# Patient Record
Sex: MALE | Race: WHITE | Employment: FULL TIME | URBAN - METROPOLITAN AREA
[De-identification: names, ages, dates, MRNs, and addresses within clinical notes are randomized per-mention and may not be internally consistent; named-entity substitution may affect disease eponyms.]

---

## 2020-10-28 ENCOUNTER — OFFICE VISIT (OUTPATIENT)
Dept: ORTHOPEDIC SURGERY | Age: 55
End: 2020-10-28
Payer: COMMERCIAL

## 2020-10-28 VITALS — WEIGHT: 215 LBS | BODY MASS INDEX: 30.78 KG/M2 | HEIGHT: 70 IN

## 2020-10-28 PROCEDURE — G8417 CALC BMI ABV UP PARAM F/U: HCPCS | Performed by: ORTHOPAEDIC SURGERY

## 2020-10-28 PROCEDURE — G8428 CUR MEDS NOT DOCUMENT: HCPCS | Performed by: ORTHOPAEDIC SURGERY

## 2020-10-28 PROCEDURE — G8484 FLU IMMUNIZE NO ADMIN: HCPCS | Performed by: ORTHOPAEDIC SURGERY

## 2020-10-28 PROCEDURE — 99204 OFFICE O/P NEW MOD 45 MIN: CPT | Performed by: ORTHOPAEDIC SURGERY

## 2020-11-04 ENCOUNTER — TELEPHONE (OUTPATIENT)
Dept: ORTHOPEDIC SURGERY | Age: 55
End: 2020-11-04

## 2020-11-05 ENCOUNTER — TELEPHONE (OUTPATIENT)
Dept: ORTHOPEDIC SURGERY | Age: 55
End: 2020-11-05

## 2020-11-05 NOTE — PROGRESS NOTES
MD Nancy Gallo, [place]         Orthopaedic Surgery and Sports Medicine    Encounter date is 10/28/2020  Patient Name: Christi Lopez  YOB: 1965  Patient's PCP is Tom Whipple MD    SUBJECTIVE  Chief Complaint:  Shoulder Pain (RIGHT)      History of Present Illness:  Christi Lopez is a right handed 48 y.o. male here regarding right shoulder pain. His primary care physicians Elida Nyhan he has been referred for evaluation and treatment of his right shoulder  This a gentleman with a progressive history of significant right shoulder pain. He works in significant manual labor. There was no specific injury. He has had no prior surgery on his right shoulder. He has had pain in intermittently since August but most recently has been much more constant. He does have difficulty sleeping. He has been taking ibuprofen as an anti-inflammatory without improvement. His pain rates from a 5 to an 8 on a pain scale. He has painful range of motion. He definitely has weakness also. There was not a history of injury. Location: diffusely throughout the shoulder  Quality: aching, sharp and constant   Pain Scale: 8/10  Context: overall course is worsening   Alleviating Factors: rest  Exacerbating Factors: elevation, activity, lifting, work and repetitive activity  Associated Symptoms: catching    Sleep pattern is affected by the chief complaint: Yes  The patient has not had PT. The patient has not had an injection. The patient has taken NSAIDs. The patient is working.     Pain Assessment:  Pain Assessment  Location of Pain: Shoulder  Location Modifiers: Right  Severity of Pain: 6  Quality of Pain: Sharp, Dull, Aching  Frequency of Pain: Intermittent  Aggravating Factors: Stretching, Straightening, Exercise  Limiting Behavior: Some  Relieving Factors: Rest  Result of Injury: No  Work-Related Injury: No  Are there other pain locations you wish to document?: No    Review of Systems:  Sam Jimenez's review of systems has been performed by intake and observation. All past and current ROS forms have been scanned into the medical record. He has been instructed to contact his primary care provider regarding ROS issues if not already being addressed at this time. There are no recent changes. The most recent ROS was scanned into media on 10/28/2020    Past Medical History:  (see most recent intake form scanned into media on above date)  Past Medical History:   Diagnosis Date    Hyperlipidemia        Past Surgical History:  (see most recent intake form scanned into media on above date)  Past Surgical History:   Procedure Laterality Date    CERVICAL DISCECTOMY      FOOT SURGERY      right    THROAT SURGERY  [id number]    removal papilloma on tonsil       Allergies:  (see most recent intake form scanned into media on above date)  No Known Allergies    Medications:  (see most recent intake form scanned into media on above date)  No current outpatient medications on file. No current facility-administered medications for this visit. Coagulation:  On a blood thinner: No  History of a bleeding disorder: No  History of a previous blood clot: No    Goal for treatment: Improve function and decrease pain    OBJECTIVE  PHYSICAL EXAM  Vital Signs: There were no vitals filed for this visit. Body mass index is 30.85 kg/m². General Appearance: Patient is adequately groomed with no evidence of malnutrition   Orientation: Patient is alert and oriented to person, place and time  Mood and Affect: Neutral/Euthymic(normal)  Gait and Station: normal    Right Shoulder Examination  Inspection:    Visual deformity noted: No    No swelling noted. No erythema or ecchymosis. Palpation: moderate tenderness to palpation on the anterior, lateral region.      Range of Motion: He has significant limitation of motion secondary to pain and weakness. His active forward flexion is about 120 degrees active abduction is only about 70 degrees due to both weakness and pain. He can externally rotate 30 degrees internally rotates only to the midline or slightly beyond midline. Passive range of motion is significantly better than active although quite painful. Stability and Special Testing:  Obriens test: positive              Apprehension test: negative              Load and Shift test: negative                                    Impingement test: positive                         Sulcus sign: negative              Bear Hug test: negative            Lift-Off test: not tested                   Cuff Drop Arm test: Moderately positive    Strength: Forward flexion: 4/5        Abduction: 4/5        Internal Rotation: 5/5        External Rotation: 5/5    Neurologic: Sensation is intact to light touch throughout the median, ulnar and radial nerve distribution. Vascular: The bilateral upper extremities are warm and well-perfused with brisk capillary refill. Lymphatic: The lymphatic examination bilaterally reveals all areas to be without enlargement or induration    Skin: intact with no cellulitis, rashes, ulcerations, lymphedema or cutaneous lesions noted. Additional Examinations:  Left Upper Extremity: Examination of the left upper extremity does not show any tenderness, deformity or injury. Range of motion is unremarkable. There is no gross instability. There are no rashes, ulcerations or lesions. Strength and tone are normal.  Neck: Examination of the neck does not show any tenderness, deformity or injury. Range of motion is within normal limits. There is no gross instability. There are no rashes, ulcerations or lesions. Strength and tone are normal.      DIAGNOSTICS:  Xrays obtained in office today:  Yes  Xrays reviewed today: Yes  Four views of the right shoulder show   Fracture: No  Dislocation: No  Acromion morphology: Type II   Acromioclavicular joint arthritis: none  Glenohumeral joint arthritis: none  Humeral head superior migration: mild        ASSESSMENT (Medical Decision Making)    Maggie Linton is a 48 y.o. male with the following diagnosis: Right shoulder pain which has been fairly significant over the last few months. Has had progressive loss of function weakness and inability to sleep. His physical examination is consistent with rotator cuff pathology probably rotator cuff tear. He has not responded to oral anti-inflammatories activity modification and localized treatment. ICD-10-CM    1. Right shoulder pain, unspecified chronicity  M25.511 XR SHOULDER RIGHT (MIN 2 VIEWS)     MRI SHOULDER RIGHT WO CONTRAST           PLAN (Medical Decision Making)  Office Procedures:  Orders Placed This Encounter   Procedures    XR SHOULDER RIGHT (MIN 2 VIEWS)    MRI SHOULDER RIGHT WO CONTRAST     Standing Status:   Future     Standing Expiration Date:   10/28/2021     Scheduling Instructions:      TBS @ [facility] AFTER APPROVAL     Order Specific Question:   Reason for exam:     Answer:   RCT       Treatment Plan:    I discussed the diagnosis and treatment options with Sam Jimenez today. I spent at least 60 minutes face to face with this patient today. Greater than 50% of that time was spent counseling, coordinating care, discussing and answering questions regarding the risks, benefits, and complications of right shoulder pain in detail. We discussed precautionary measures to prevent further injury, additional treatment options, additional diagnostic options including MRI scan. He is being referred today for an MRI scan. He may require surgical intervention based on the results of his MRI. Non-steroidal anti-inflammatories medications (NSAIDs) can be used to assist with pain control and to reduce inflammatory changes.   These medications may be over-the-counter or prescribed. We discussed taking the NSAID properly and the precautions. The patient understands that this medication may potentially interfere with other medications. Patient was also instructed to immediately discontinue the medication is there is any possible complication. Jenn Reed was instructed to call the office if his symptoms worsen or if new symptoms appear prior to the next scheduled visit. He is specifically instructed to contact the office between now and schedule appointment if he has concerns related to his condition or if he needs assistance in scheduling any above tests. He is welcome to call for an appointment sooner if he has any additional concerns or questions. This dictation was performed with a verbal recognition program (DRAGON) and it was checked for errors. It is possible that there are still dictated errors within this office note. If so, please bring any errors to my attention for an addendum. All efforts were made to ensure that this office note is accurate.

## 2020-11-11 ENCOUNTER — OFFICE VISIT (OUTPATIENT)
Dept: ORTHOPEDIC SURGERY | Age: 55
End: 2020-11-11
Payer: COMMERCIAL

## 2020-11-11 VITALS — HEIGHT: 70 IN | WEIGHT: 215 LBS | BODY MASS INDEX: 30.78 KG/M2

## 2020-11-11 PROCEDURE — G8428 CUR MEDS NOT DOCUMENT: HCPCS | Performed by: ORTHOPAEDIC SURGERY

## 2020-11-11 PROCEDURE — 3017F COLORECTAL CA SCREEN DOC REV: CPT | Performed by: ORTHOPAEDIC SURGERY

## 2020-11-11 PROCEDURE — G8417 CALC BMI ABV UP PARAM F/U: HCPCS | Performed by: ORTHOPAEDIC SURGERY

## 2020-11-11 PROCEDURE — G8484 FLU IMMUNIZE NO ADMIN: HCPCS | Performed by: ORTHOPAEDIC SURGERY

## 2020-11-11 PROCEDURE — 4004F PT TOBACCO SCREEN RCVD TLK: CPT | Performed by: ORTHOPAEDIC SURGERY

## 2020-11-11 PROCEDURE — 99213 OFFICE O/P EST LOW 20 MIN: CPT | Performed by: ORTHOPAEDIC SURGERY

## 2020-11-11 NOTE — PROGRESS NOTES
MD Jeffery Rankin, [place]         Orthopaedic Surgery and Sports Medicine    Encounter date is 10/28/2020  Patient Name: Binh Rodríguez  YOB: 1965  Patient's PCP is Jem Mac MD    SUBJECTIVE  Chief Complaint:  Shoulder Pain (RIGHT MRI RESULTS)      History of Present Illness:  Binh Rodríguez is a right handed 48 y.o. male here regarding right shoulder pain. His primary care physicians Ayesha Guillaume he has been referred for evaluation and treatment of his right shoulder  This a gentleman with a progressive history of significant right shoulder pain. He works in significant manual labor. Following his previous visit he was referred for an MRI scan. He had a long history of shoulder pain. Continues to get worse. There was no specific injury. He has had no prior surgery on his right shoulder. He has had pain in intermittently since August but most recently has been much more constant. He does have difficulty sleeping. He has been taking ibuprofen as an anti-inflammatory without improvement. His pain rates from a 5 to an 8 on a pain scale. He has painful range of motion. He definitely has weakness also. There was not a history of injury. Location: diffusely throughout the shoulder  Quality: aching, sharp and constant   Pain Scale: 8/10  Context: overall course is worsening   Alleviating Factors: rest  Exacerbating Factors: elevation, activity, lifting, work and repetitive activity  Associated Symptoms: catching    Sleep pattern is affected by the chief complaint: Yes  The patient has not had PT. The patient has not had an injection. The patient has taken NSAIDs. The patient is working.     Pain Assessment:  Pain Assessment  Location of Pain: Shoulder  Location Modifiers: Left  Severity of Pain: 2  Quality of Pain: Aching, Dull  Frequency of Pain: Intermittent  Aggravating Factors: Stretching, Straightening, Exercise  Relieving Factors: Rest, Nsaids  Result of Injury: No  Work-Related Injury: No  Are there other pain locations you wish to document?: No    Review of Systems:  Sam Jimenez's review of systems has been performed by intake and observation. All past and current ROS forms have been scanned into the medical record. He has been instructed to contact his primary care provider regarding ROS issues if not already being addressed at this time. There are no recent changes. The most recent ROS was scanned into media on 10/28/2020    Past Medical History:  (see most recent intake form scanned into media on above date)  Past Medical History:   Diagnosis Date    Hyperlipidemia        Past Surgical History:  (see most recent intake form scanned into media on above date)  Past Surgical History:   Procedure Laterality Date    CERVICAL DISCECTOMY      FOOT SURGERY      right    THROAT SURGERY  [id number]    removal papilloma on tonsil       Allergies:  (see most recent intake form scanned into media on above date)  No Known Allergies    Medications:  (see most recent intake form scanned into media on above date)  No current outpatient medications on file. No current facility-administered medications for this visit. Coagulation:  On a blood thinner: No  History of a bleeding disorder: No  History of a previous blood clot: No    Goal for treatment: Improve function and decrease pain    OBJECTIVE  PHYSICAL EXAM  Vital Signs: There were no vitals filed for this visit. Body mass index is 30.85 kg/m². General Appearance: Patient is adequately groomed with no evidence of malnutrition   Orientation: Patient is alert and oriented to person, place and time  Mood and Affect: Neutral/Euthymic(normal)  Gait and Station: normal    Right Shoulder Examination  Inspection:    Visual deformity noted: No    No swelling noted. No erythema or ecchymosis. Palpation: moderate tenderness to palpation on the anterior, lateral region. That pain continues to progress. 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 Range of Motion: He has significant limitation of motion secondary to pain and weakness. His active forward flexion is about 120 degrees active abduction is only about 70 degrees due to both weakness and pain. Passive range of motion is significantly better than active although quite painful. His biceps tests are all positive. His [place]'s test is very positive. So his superior labral tear and the associated biceps injury are all related. Stability and Special Testing:  Obriens test: positive              Apprehension test: negative              Load and Shift test: negative                                    Impingement test: positive                         Sulcus sign: negative              Bear Hug test: negative            Lift-Off test: not tested                   Cuff Drop Arm test: Moderately positive    Strength: Forward flexion: 4/5        Abduction: 4/5        Internal Rotation: 5/5        External Rotation: 5/5    Neurologic: Sensation is intact to light touch throughout the median, ulnar and radial nerve distribution. Vascular: The bilateral upper extremities are warm and well-perfused with brisk capillary refill. Lymphatic: The lymphatic examination bilaterally reveals all areas to be without enlargement or induration    Skin: intact with no cellulitis, rashes, ulcerations, lymphedema or cutaneous lesions noted. Additional Examinations:  Left Upper Extremity: Examination of the left upper extremity does not show any tenderness, deformity or injury. Range of motion is unremarkable. There is no gross instability. There are no rashes, ulcerations or lesions.   Strength and tone are normal.  Neck: Examination of the neck does not show any tenderness, deformity or injury. Range of motion is within normal limits. There is no gross instability. There are no rashes, ulcerations or lesions. Strength and tone are normal.      DIAGNOSTICS:  Xrays obtained in office today: No  Xrays reviewed today: Yes, reviewed  Four views of the right shoulder show   Fracture: No  Dislocation: No  Acromion morphology: Type II   Acromioclavicular joint arthritis: none  Glenohumeral joint arthritis: none  Humeral head superior migration: mild    MRI scan of the right shoulder was performed on 11/6/2020 at [address] [place] [address]. He has a large circumferential tear of the labrum both anterior posterior superior and inferior with perilabral cysts. Is associated with instability of his biceps tendon. He has rotator cuff tendinitis. He has significant degenerative AC joint arthropathy with some marrow edema in the distal clavicle and the acromion. ASSESSMENT (Medical Decision Making)    Kalee Byrd is a 48 y.o. male with the following diagnosis: Right shoulder pain which has been fairly significant over the last few months. Has had progressive loss of function weakness and inability to sleep. His physical examination and his MRI are consistent with the findings. He has a very large labral tear with associated instability and tearing of the biceps tendon. He also has rotator cuff tendinitis with outlet stenosis and AC joint arthritis both of which are impinging upon his rotator cuff tendon. No diagnosis found. PLAN (Medical Decision Making)  Office Procedures:  No orders of the defined types were placed in this encounter. Treatment Plan:    I discussed the diagnosis and treatment options with Sam Jimenez today. The current plan after discussion is to proceed with a video arthroscopy surgical intervention of the right shoulder. He will have a subacromial decompression distal clavicle resection with least 1 cm resection from the clavicle.   That would include include debridement of the [facility] joint. Also will require debridement of the labrum and probable biceps tenodesis to take the stress off of the superior labral chronic tear. There will be inspection of the glenoid labrum probable debridement of the subacromial bursitis. We also discussed with him the postoperative rehab protocol. He will follow-up with us postop. After discussion today, the patient has elected to proceed with surgical intervention. The surgical procedure was discussed in detail. The risks and possible complications of the surgery in general, as well as those directly related to this procedure were reviewed today. General risks include but are not limited to persistent pain, infection (including COVID-19), excessive blood loss, neurovascular injury, chronic swelling or edema, and the potential need for additional treatment or surgical intervention in the future. The patient understands that, again, the risks and possible complications are not limited to those specifically stated above. In addition today, we discussed the preoperative and postoperative protocol, the often lengthy recovery, and the expectation that the patient will be compliant with instructions and perform the appropriate rehab program as prescribed. The patient accepted the above risks, possible complications, and protocol and elects to proceed. All questions were answered appropriately, and they understand that they can contact the office at any time to further discuss any questions or concerns. Radha Gamboa was instructed to call the office if his symptoms worsen or if new symptoms appear prior to the next scheduled visit. He is specifically instructed to contact the office between now and schedule appointment if he has concerns related to his condition or if he needs assistance in scheduling any above tests. He is welcome to call for an appointment sooner if he has any additional concerns or questions.         This

## 2020-12-03 ENCOUNTER — TELEPHONE (OUTPATIENT)
Dept: ORTHOPEDIC SURGERY | Age: 55
End: 2020-12-03

## 2020-12-07 NOTE — PROGRESS NOTES
Neomia Boaz    Age 48 y.o.    male    1965    MRN [id number]    12/15/2020  Arrival Time_____________  OR Time____________[address]     Procedure(s):  DIAGNOSTIC VIDEO ARTHROSCOPY RIGHT SHOULDER, OPEN BICEPS TENODESIS, DECOMPRESSION, DISTAL CLAVICLE RESECTION -BLOCK-                      General    Surgeon(s):  Manjula Lopes, MD       Phone [phone] ([place]) [phone] (work)    [address] [place] [address]  Cell Work  _________________________________________________________________  _________________________________________________________________  _________________________________________________________________  _________________________________________________________________  _________________________________________________________________      PCP _____________________________ Phone_________________       H&P__________________Bringing    Chart            Epic  DOS     Called_______  EKG__________________Bringing    Chart            Epic  DOS     Called_______  LAB__________________ Bringing    Chart            Epic  DOS     Called_______  Cardiac Clearance_______Bringing    Chart            Epic      DOS       Called_______    Cardiologist________________________ Phone___________________________      ? Tenriism concerns / Waiver on Chart            [employer]_____________  ? Pre-op Instructions Given [place]          ______________________________  ? Directions to Surgery Center                          ______________________________  ? Transportation Home_______________      _______________________________  ?  Crutches/Walker__________________        _______________________________      ________Pre-op Orders   _______Transcribed    _______Wt.  ________Pharmacy          _______SCD  ______VTE     ______[employer]  ________Consent             ________TED Maryfrances [place]

## 2020-12-08 RX ORDER — ROSUVASTATIN CALCIUM 20 MG/1
20 TABLET, COATED ORAL DAILY
COMMUNITY

## 2020-12-08 NOTE — PROGRESS NOTES
Obstructive Sleep Apnea (JEFFREY) Screening     Patient:  Severo Vu    YOB: 1965      Medical Record #:  [id number]                     Date:  12/8/2020     1. Are you a loud and/or regular snorer? [x]  Yes       [] No    2. Have you been observed to gasp or stop breathing during sleep? [x]  Yes       [] No    3. Do you feel tired or groggy upon awakening or do you awaken with a headache?           []  Yes       [x] No    4. Are you often tired or fatigued during the wake time hours? []  Yes       [x] No    5. Do you fall asleep sitting, reading, watching TV or driving? []  Yes       [x] No    6. Do you often have problems with memory or concentration? []  Yes       [x] No    **If patient's score is ? 3 they are considered high risk for JEFFREY. An Anesthesia provider will evaluate the patient and develop a plan of care the day of surgery. Note:  If the patient's BMI is more than 35 kg m¯² , has neck circumference > 40 cm, and/or high blood pressure the risk is greater (© American Sleep Apnea Association, 2006).

## 2020-12-09 ENCOUNTER — OFFICE VISIT (OUTPATIENT)
Dept: PRIMARY CARE CLINIC | Age: 55
End: 2020-12-09

## 2020-12-09 NOTE — PROGRESS NOTES
Sam Jimenez received a viral test for COVID-19. They were educated on isolation and quarantine as appropriate. For any symptoms, they were directed to seek care from their PCP, given contact information to establish with a doctor, directed to an urgent care or the emergency room.

## 2020-12-09 NOTE — PATIENT INSTRUCTIONS

## 2020-12-10 LAB — SARS-COV-2, NAA: NOT DETECTED

## 2020-12-11 NOTE — TELEPHONE ENCOUNTER
[address] website still pending. Can't expedite today. If I don't receive auth by Monday then I will expedite.

## 2020-12-14 ENCOUNTER — TELEPHONE (OUTPATIENT)
Dept: ORTHOPEDIC SURGERY | Age: 55
End: 2020-12-14

## 2020-12-14 ENCOUNTER — ANESTHESIA EVENT (OUTPATIENT)
Dept: OPERATING ROOM | Age: 55
End: 2020-12-14
Payer: COMMERCIAL

## 2020-12-14 NOTE — TELEPHONE ENCOUNTER
Auth: # [id number]    Date: 12/15/2020 - 03/15/2021  Type of SX:  OP  Location: [facility]  CPT: [id number]  [id number]   [id number]   DX Code: X09.492  L12.114   M75.41   M19.011  SX area: VAS biceps tenodesis decompression rt shoulder  Insurance: [facility]

## 2020-12-14 NOTE — TELEPHONE ENCOUNTER
Patient called today after calling [facility] with approval # for OP surgery on 12/15/20 #[id number]

## 2020-12-15 ENCOUNTER — ANESTHESIA (OUTPATIENT)
Dept: OPERATING ROOM | Age: 55
End: 2020-12-15
Payer: COMMERCIAL

## 2020-12-15 ENCOUNTER — HOSPITAL ENCOUNTER (OUTPATIENT)
Age: 55
Setting detail: OUTPATIENT SURGERY
Discharge: HOME | End: 2020-12-15
Attending: ORTHOPAEDIC SURGERY | Admitting: ORTHOPAEDIC SURGERY
Payer: COMMERCIAL

## 2020-12-15 VITALS
RESPIRATION RATE: 6 BRPM | SYSTOLIC BLOOD PRESSURE: 92 MMHG | OXYGEN SATURATION: 96 % | DIASTOLIC BLOOD PRESSURE: 49 MMHG

## 2020-12-15 VITALS
SYSTOLIC BLOOD PRESSURE: 135 MMHG | DIASTOLIC BLOOD PRESSURE: 96 MMHG | OXYGEN SATURATION: 94 % | RESPIRATION RATE: 19 BRPM | TEMPERATURE: 97.8 F | HEART RATE: 79 BPM | WEIGHT: 220 LBS | BODY MASS INDEX: 31.5 KG/M2 | HEIGHT: 70 IN

## 2020-12-15 PROCEDURE — 6360000002 HC RX W HCPCS: Performed by: ANESTHESIOLOGY

## 2020-12-15 PROCEDURE — 6360000002 HC RX W HCPCS: Performed by: ORTHOPAEDIC SURGERY

## 2020-12-15 PROCEDURE — 2500000003 HC RX 250 WO HCPCS: Performed by: NURSE ANESTHETIST, CERTIFIED REGISTERED

## 2020-12-15 PROCEDURE — 3600000014 HC SURGERY LEVEL 4 ADDTL 15MIN: Performed by: ORTHOPAEDIC SURGERY

## 2020-12-15 PROCEDURE — 2580000003 HC RX 258: Performed by: ANESTHESIOLOGY

## 2020-12-15 PROCEDURE — 3700000001 HC ADD 15 MINUTES (ANESTHESIA): Performed by: ORTHOPAEDIC SURGERY

## 2020-12-15 PROCEDURE — 7100000000 HC PACU RECOVERY - FIRST 15 MIN: Performed by: ORTHOPAEDIC SURGERY

## 2020-12-15 PROCEDURE — 64415 NJX AA&/STRD BRCH PLXS IMG: CPT | Performed by: ANESTHESIOLOGY

## 2020-12-15 PROCEDURE — 7100000011 HC PHASE II RECOVERY - ADDTL 15 MIN: Performed by: ORTHOPAEDIC SURGERY

## 2020-12-15 PROCEDURE — 2709999900 HC NON-CHARGEABLE SUPPLY: Performed by: ORTHOPAEDIC SURGERY

## 2020-12-15 PROCEDURE — 2580000003 HC RX 258: Performed by: ORTHOPAEDIC SURGERY

## 2020-12-15 PROCEDURE — 7100000010 HC PHASE II RECOVERY - FIRST 15 MIN: Performed by: ORTHOPAEDIC SURGERY

## 2020-12-15 PROCEDURE — 3700000000 HC ANESTHESIA ATTENDED CARE: Performed by: ORTHOPAEDIC SURGERY

## 2020-12-15 PROCEDURE — 2500000003 HC RX 250 WO HCPCS: Performed by: ORTHOPAEDIC SURGERY

## 2020-12-15 PROCEDURE — C1769 GUIDE WIRE: HCPCS | Performed by: ORTHOPAEDIC SURGERY

## 2020-12-15 PROCEDURE — 6360000002 HC RX W HCPCS: Performed by: NURSE ANESTHETIST, CERTIFIED REGISTERED

## 2020-12-15 PROCEDURE — 2720000010 HC SURG SUPPLY STERILE: Performed by: ORTHOPAEDIC SURGERY

## 2020-12-15 PROCEDURE — 7100000001 HC PACU RECOVERY - ADDTL 15 MIN: Performed by: ORTHOPAEDIC SURGERY

## 2020-12-15 PROCEDURE — 3600000004 HC SURGERY LEVEL 4 BASE: Performed by: ORTHOPAEDIC SURGERY

## 2020-12-15 PROCEDURE — C1713 ANCHOR/SCREW BN/BN,TIS/BN: HCPCS | Performed by: ORTHOPAEDIC SURGERY

## 2020-12-15 DEVICE — ENDOBUTTON CL ULTRA 20MM
Type: IMPLANTABLE DEVICE | Site: SHOULDER | Status: FUNCTIONAL
Brand: ENDOBUTTON

## 2020-12-15 RX ORDER — MIDAZOLAM HYDROCHLORIDE 1 MG/ML
INJECTION INTRAMUSCULAR; INTRAVENOUS
Status: COMPLETED
Start: 2020-12-15 | End: 2020-12-15

## 2020-12-15 RX ORDER — DIPHENHYDRAMINE HYDROCHLORIDE 50 MG/ML
12.5 INJECTION INTRAMUSCULAR; INTRAVENOUS
Status: DISCONTINUED | OUTPATIENT
Start: 2020-12-15 | End: 2020-12-15 | Stop reason: HOSPADM

## 2020-12-15 RX ORDER — MORPHINE SULFATE 2 MG/ML
2 INJECTION, SOLUTION INTRAMUSCULAR; INTRAVENOUS EVERY 5 MIN PRN
Status: DISCONTINUED | OUTPATIENT
Start: 2020-12-15 | End: 2020-12-15 | Stop reason: HOSPADM

## 2020-12-15 RX ORDER — SODIUM CHLORIDE, SODIUM LACTATE, POTASSIUM CHLORIDE, CALCIUM CHLORIDE 600; 310; 30; 20 MG/100ML; MG/100ML; MG/100ML; MG/100ML
INJECTION, SOLUTION INTRAVENOUS CONTINUOUS
Status: DISCONTINUED | OUTPATIENT
Start: 2020-12-15 | End: 2020-12-15 | Stop reason: HOSPADM

## 2020-12-15 RX ORDER — LABETALOL HYDROCHLORIDE 5 MG/ML
5 INJECTION, SOLUTION INTRAVENOUS EVERY 10 MIN PRN
Status: DISCONTINUED | OUTPATIENT
Start: 2020-12-15 | End: 2020-12-15 | Stop reason: HOSPADM

## 2020-12-15 RX ORDER — FENTANYL CITRATE 50 UG/ML
INJECTION, SOLUTION INTRAMUSCULAR; INTRAVENOUS
Status: COMPLETED
Start: 2020-12-15 | End: 2020-12-15

## 2020-12-15 RX ORDER — ONDANSETRON 2 MG/ML
INJECTION INTRAMUSCULAR; INTRAVENOUS PRN
Status: DISCONTINUED | OUTPATIENT
Start: 2020-12-15 | End: 2020-12-15 | Stop reason: SDUPTHER

## 2020-12-15 RX ORDER — MIDAZOLAM HYDROCHLORIDE 1 MG/ML
INJECTION INTRAMUSCULAR; INTRAVENOUS PRN
Status: DISCONTINUED | OUTPATIENT
Start: 2020-12-15 | End: 2020-12-15 | Stop reason: SDUPTHER

## 2020-12-15 RX ORDER — OXYCODONE HYDROCHLORIDE AND ACETAMINOPHEN 5; 325 MG/1; MG/1
2 TABLET ORAL PRN
Status: DISCONTINUED | OUTPATIENT
Start: 2020-12-15 | End: 2020-12-15 | Stop reason: HOSPADM

## 2020-12-15 RX ORDER — BUPIVACAINE HYDROCHLORIDE 2.5 MG/ML
INJECTION, SOLUTION INFILTRATION; PERINEURAL PRN
Status: DISCONTINUED | OUTPATIENT
Start: 2020-12-15 | End: 2020-12-15 | Stop reason: ALTCHOICE

## 2020-12-15 RX ORDER — LIDOCAINE HYDROCHLORIDE 10 MG/ML
0.3 INJECTION, SOLUTION EPIDURAL; INFILTRATION; INTRACAUDAL; PERINEURAL
Status: DISCONTINUED | OUTPATIENT
Start: 2020-12-15 | End: 2020-12-15 | Stop reason: HOSPADM

## 2020-12-15 RX ORDER — LIDOCAINE HYDROCHLORIDE 20 MG/ML
INJECTION, SOLUTION INFILTRATION; PERINEURAL PRN
Status: DISCONTINUED | OUTPATIENT
Start: 2020-12-15 | End: 2020-12-15 | Stop reason: SDUPTHER

## 2020-12-15 RX ORDER — PROMETHAZINE HYDROCHLORIDE 25 MG/ML
6.25 INJECTION, SOLUTION INTRAMUSCULAR; INTRAVENOUS
Status: DISCONTINUED | OUTPATIENT
Start: 2020-12-15 | End: 2020-12-15 | Stop reason: HOSPADM

## 2020-12-15 RX ORDER — SODIUM CHLORIDE, SODIUM LACTATE, POTASSIUM CHLORIDE, AND CALCIUM CHLORIDE .6; .31; .03; .02 G/100ML; G/100ML; G/100ML; G/100ML
IRRIGANT IRRIGATION PRN
Status: DISCONTINUED | OUTPATIENT
Start: 2020-12-15 | End: 2020-12-15 | Stop reason: ALTCHOICE

## 2020-12-15 RX ORDER — ROCURONIUM BROMIDE 10 MG/ML
INJECTION, SOLUTION INTRAVENOUS PRN
Status: DISCONTINUED | OUTPATIENT
Start: 2020-12-15 | End: 2020-12-15 | Stop reason: SDUPTHER

## 2020-12-15 RX ORDER — DEXAMETHASONE SODIUM PHOSPHATE 10 MG/ML
INJECTION INTRAMUSCULAR; INTRAVENOUS PRN
Status: DISCONTINUED | OUTPATIENT
Start: 2020-12-15 | End: 2020-12-15 | Stop reason: SDUPTHER

## 2020-12-15 RX ORDER — FENTANYL CITRATE 50 UG/ML
INJECTION, SOLUTION INTRAMUSCULAR; INTRAVENOUS PRN
Status: DISCONTINUED | OUTPATIENT
Start: 2020-12-15 | End: 2020-12-15 | Stop reason: SDUPTHER

## 2020-12-15 RX ORDER — HYDRALAZINE HYDROCHLORIDE 20 MG/ML
5 INJECTION INTRAMUSCULAR; INTRAVENOUS EVERY 10 MIN PRN
Status: DISCONTINUED | OUTPATIENT
Start: 2020-12-15 | End: 2020-12-15 | Stop reason: HOSPADM

## 2020-12-15 RX ORDER — SODIUM CHLORIDE 0.9 % (FLUSH) 0.9 %
10 SYRINGE (ML) INJECTION PRN
Status: DISCONTINUED | OUTPATIENT
Start: 2020-12-15 | End: 2020-12-15 | Stop reason: HOSPADM

## 2020-12-15 RX ORDER — OXYCODONE HYDROCHLORIDE AND ACETAMINOPHEN 5; 325 MG/1; MG/1
1 TABLET ORAL EVERY 6 HOURS PRN
Qty: 28 TABLET | Refills: 0 | Status: SHIPPED | OUTPATIENT
Start: 2020-12-15 | End: 2020-12-22

## 2020-12-15 RX ORDER — MAGNESIUM HYDROXIDE 1200 MG/15ML
LIQUID ORAL CONTINUOUS PRN
Status: COMPLETED | OUTPATIENT
Start: 2020-12-15 | End: 2020-12-15

## 2020-12-15 RX ORDER — OXYCODONE HYDROCHLORIDE AND ACETAMINOPHEN 5; 325 MG/1; MG/1
1 TABLET ORAL PRN
Status: DISCONTINUED | OUTPATIENT
Start: 2020-12-15 | End: 2020-12-15 | Stop reason: HOSPADM

## 2020-12-15 RX ORDER — MORPHINE SULFATE 2 MG/ML
1 INJECTION, SOLUTION INTRAMUSCULAR; INTRAVENOUS EVERY 5 MIN PRN
Status: DISCONTINUED | OUTPATIENT
Start: 2020-12-15 | End: 2020-12-15 | Stop reason: HOSPADM

## 2020-12-15 RX ORDER — SODIUM CHLORIDE 0.9 % (FLUSH) 0.9 %
10 SYRINGE (ML) INJECTION EVERY 12 HOURS SCHEDULED
Status: DISCONTINUED | OUTPATIENT
Start: 2020-12-15 | End: 2020-12-15 | Stop reason: HOSPADM

## 2020-12-15 RX ORDER — MEPERIDINE HYDROCHLORIDE 50 MG/ML
12.5 INJECTION INTRAMUSCULAR; INTRAVENOUS; SUBCUTANEOUS EVERY 5 MIN PRN
Status: DISCONTINUED | OUTPATIENT
Start: 2020-12-15 | End: 2020-12-15 | Stop reason: HOSPADM

## 2020-12-15 RX ORDER — ONDANSETRON 2 MG/ML
4 INJECTION INTRAMUSCULAR; INTRAVENOUS PRN
Status: DISCONTINUED | OUTPATIENT
Start: 2020-12-15 | End: 2020-12-15 | Stop reason: HOSPADM

## 2020-12-15 RX ADMIN — SODIUM CHLORIDE, POTASSIUM CHLORIDE, SODIUM LACTATE AND CALCIUM CHLORIDE: 600; 310; 30; 20 INJECTION, SOLUTION INTRAVENOUS at 06:41

## 2020-12-15 RX ADMIN — MIDAZOLAM HYDROCHLORIDE 4 MG: 2 INJECTION, SOLUTION INTRAMUSCULAR; INTRAVENOUS at 06:54

## 2020-12-15 RX ADMIN — SUGAMMADEX 300 MG: 100 INJECTION, SOLUTION INTRAVENOUS at 09:06

## 2020-12-15 RX ADMIN — LIDOCAINE HYDROCHLORIDE 60 MG: 20 INJECTION, SOLUTION INFILTRATION; PERINEURAL at 07:32

## 2020-12-15 RX ADMIN — ROCURONIUM BROMIDE 20 MG: 10 SOLUTION INTRAVENOUS at 08:39

## 2020-12-15 RX ADMIN — DEXAMETHASONE SODIUM PHOSPHATE 8 MG: 10 INJECTION INTRAMUSCULAR; INTRAVENOUS at 07:32

## 2020-12-15 RX ADMIN — SODIUM CHLORIDE, POTASSIUM CHLORIDE, SODIUM LACTATE AND CALCIUM CHLORIDE: 600; 310; 30; 20 INJECTION, SOLUTION INTRAVENOUS at 09:20

## 2020-12-15 RX ADMIN — CEFAZOLIN 2 G: 10 INJECTION, POWDER, FOR SOLUTION INTRAVENOUS at 07:43

## 2020-12-15 RX ADMIN — MIDAZOLAM HYDROCHLORIDE 2 MG: 2 INJECTION, SOLUTION INTRAMUSCULAR; INTRAVENOUS at 07:28

## 2020-12-15 RX ADMIN — ONDANSETRON 4 MG: 2 INJECTION INTRAMUSCULAR; INTRAVENOUS at 07:40

## 2020-12-15 RX ADMIN — FENTANYL CITRATE 100 MCG: 50 INJECTION INTRAMUSCULAR; INTRAVENOUS at 06:54

## 2020-12-15 RX ADMIN — ROCURONIUM BROMIDE 50 MG: 10 SOLUTION INTRAVENOUS at 07:32

## 2020-12-15 ASSESSMENT — PULMONARY FUNCTION TESTS
PIF_VALUE: 23
PIF_VALUE: 23
PIF_VALUE: 24
PIF_VALUE: 23
PIF_VALUE: 24
PIF_VALUE: 24
PIF_VALUE: 23
PIF_VALUE: 24
PIF_VALUE: 24
PIF_VALUE: 10
PIF_VALUE: 2
PIF_VALUE: 1
PIF_VALUE: 24
PIF_VALUE: 23
PIF_VALUE: 24
PIF_VALUE: 6
PIF_VALUE: 23
PIF_VALUE: 19
PIF_VALUE: 23
PIF_VALUE: 24
PIF_VALUE: 24
PIF_VALUE: 25
PIF_VALUE: 24
PIF_VALUE: 24
PIF_VALUE: 1
PIF_VALUE: 24
PIF_VALUE: 23
PIF_VALUE: 23
PIF_VALUE: 24
PIF_VALUE: 23
PIF_VALUE: 23
PIF_VALUE: 24
PIF_VALUE: 23
PIF_VALUE: 24
PIF_VALUE: 23
PIF_VALUE: 24
PIF_VALUE: 9
PIF_VALUE: 23
PIF_VALUE: 24
PIF_VALUE: 22
PIF_VALUE: 23
PIF_VALUE: 24
PIF_VALUE: 23
PIF_VALUE: 24
PIF_VALUE: 23
PIF_VALUE: 23
PIF_VALUE: 24
PIF_VALUE: 25
PIF_VALUE: 23
PIF_VALUE: 19
PIF_VALUE: 23
PIF_VALUE: 26
PIF_VALUE: 1
PIF_VALUE: 24
PIF_VALUE: 23
PIF_VALUE: 24
PIF_VALUE: 23
PIF_VALUE: 23
PIF_VALUE: 24
PIF_VALUE: 24
PIF_VALUE: 11
PIF_VALUE: 23
PIF_VALUE: 1
PIF_VALUE: 1
PIF_VALUE: 23
PIF_VALUE: 24
PIF_VALUE: 5
PIF_VALUE: 23
PIF_VALUE: 23
PIF_VALUE: 24
PIF_VALUE: 23
PIF_VALUE: 24
PIF_VALUE: 24
PIF_VALUE: 23
PIF_VALUE: 2
PIF_VALUE: 24
PIF_VALUE: 0
PIF_VALUE: 23
PIF_VALUE: 23
PIF_VALUE: 24
PIF_VALUE: 23
PIF_VALUE: 23
PIF_VALUE: 22
PIF_VALUE: 24
PIF_VALUE: 24
PIF_VALUE: 1
PIF_VALUE: 24
PIF_VALUE: 23
PIF_VALUE: 23

## 2020-12-15 ASSESSMENT — PAIN SCALES - GENERAL
PAINLEVEL_OUTOF10: 0

## 2020-12-15 ASSESSMENT — PAIN - FUNCTIONAL ASSESSMENT: PAIN_FUNCTIONAL_ASSESSMENT: 0-10

## 2020-12-15 NOTE — OP NOTE
Diagnostic Shoulder Arthroscopy with Subacromial Decompression and Distal Clavicle Resection      aSm Jimenez (1965)   Date of Service: 12/15/2020      Preoperative Diagnosis-     · right shoulder Acromioclavicular joint arthritis/osteolysis   · right shoulder Biceps Tear with Tendonopathy   · right shoulder Type I and II labral tears    · right shoulderOutlet Stenosis with Subacromial Impingement       right shoulder operative Diagnosis-   · Same     Procedure-  Right shoulder open subpectoral biceps tenodesis (CPT-71189)     · right shoulderrthroscopic Acromioclavicular joint resection (>1 centimeter) ([id number])   · right shoulderArthroscopic Extensive Debridement of Intra-articular space  ([id number])  · right shoulder Arthroscopic Subacromial Decompression and Partial Acromioplasty  CPT- 97533)     Estimated blood Loss:  Less than 50 cc                          Surgeon-  Memo Vega MD      Anesthesia- Suprascapular Nerve Block and General      Indications for Operation  Persistent showed pain with subjective and objective findings consistent with outlet stenosis and subacromial impingement as well as AC joint symptoms consistent with acromioclavicular joint arthritis, degenerative changes and possible osteolysis,. The patient chose to proceed with the aforementioned procedures. At no time were any guarantees implied or stated. Site Marking and Surgical Prep  The patient was seen in the holding area and the appropriate extremity marked with a pen. The patient was taken to the operative suite, identified, placed on the operating room table in the supine position. After induction of general anesthesia, the patient was placed in the lateral decubitus position with an axillary roll and all bony prominences adequately padded. Examination  Under Anesthesia & Suprascapular Block. Full symmetric range of motion, no instability. The arm was then placed in the arm arriaza.   A suprascapular nerve block was performed with 30ml of 0.25% marcaine. Diagnostic Arthroscopy  The upper extremity was prepped with chloraprep and draped in the standard fashion. A spinal needle was inserted into the glenohumeral joint and it was insufflated with 30ml of normal saline. A standard midglenoid posterior portal was established. The trocar and cannula were inserted. The trocar was removed and the arthroscope and inflow inserted. Systematic intra-articular arthroscopy was performed and the findings are summarized below. 1.  Superior Labrum/Biceps Tendon were both torn  2. Anterior/Posterior Labrum with type I and II labral tears  3. Rotator Cuff- no tears  4. Inferior Axillary Recess- no lloose bodies  5. Articular Surfaces- Intact  6. Synovium showed evidence of significant hypertrophic synovitis  7. The articular surface of the rotator cuff tendons appeared intact    Arthroscopic extensive intra-articular debridement  With the arthroscope in the subacromial space visualizing the areas of labral pathology, inflammatory hypertrophic synovitis, and very small areas of minimal articular cartilage change as well as damage to the rotator cuff tendon I felt that treatment via debridement of these lesions was indicated. This is a identification of these pathologies occurred primarily because of diagnostic video arthroscopy which was performed as stated above. At this point we placed an anterior mid glenoid portal and proceeded with extensive debridement of the labral pathology which was nearly circum-circumferential.  Debridement was performed of the articular surfaces of any defects as well as a hypertrophic synovitis. Due to the biceps tendon injury debridement of the biceps tendon was necessary also. Subacromial Decompression and Partial Acromioplasty  The trocar and cannula were redirected to the subacromial space. The arthroscope and inflow were inserted.   A lateral portal was established after localizing the subacromial space with a spinal needle. Using a both a shaver and a radiofrequency probe, the bursa and the tissue beneath the acromion were removed. The coracromial ligament was divided. A 5.5 mm bur was placed through the lateral portal and the anterior aspect of the acromion was removed. Distal Clavicle Resection  An anterior portal was established after localizing the [facility] joint with a spinal needle. The distal clavicle was carefully exposed by removing soft tissue with both a shaver and a radiofrequency probe. With the arthroscope in the lateral portal, the 5.5 mm bur was placed through the anterior portal and 1 cm of distal clavicle was removed. The arthroscope was placed in the anterior portal to confirm a smooth, symmetric resection space. Bursal Surface of Rotator Cuff  There was some minimal scuffing both no evidence of a tear requiring repair    Open Long Head Biceps Tenodesis    The arm was then repositioned for the open subpectoral biceps tenodesis. Due to the potential increased risk of infection when converting from arthroscopy to open surgery, certain precautionary measures were taken at this point to reduce that risk. Although this area was previously prepped with ChloraPrep for the arthroscopic portion the area of open surgery was reprepped with new ChloraPrep sponge application. Myself and the immediate surgical team replaced our outer gloves with a new pair of gloves. The area was over draped with a three-quarter sheet and Ioban drape was used at this location as well. Once these precautionary measures were taken to reduce our risk of infection the open procedure proceeded. A subpectoral incision was made. Garrick retractors were placed around the humerus. The biceps tendon was identified and exteriorized. The biceps tendon was prepared using a FiberLoop. The bicipital groove was prepared by removing soft tissue.      A unicortical Endobutton proximal humeral biceps tenodesis was then planned. The guidepin for the unicortical button was drilled through the anterior cortex of the humerus. A reamer was used to open the anterior cortex. The sutures from the biceps tendon were threaded through the button, which was then placed through anterior cortex of the humerus. Secure fixation on the inner cortex was confirmed and the tendon was pulled into the humeral tunnel. The suture was tied with 3 half-hitches using a knot pusher securing the tendon in place. The tendon was well secured at the level of the inferior border of the pectoralis major tendon. The wound was irrigated with normal saline and the layers closed using 2-0 vicryl and 3-0 nylon. Closure  The shoulder was drained. Arthroscopic equipment was removed and the portals closed with 3-0 Nylon. Sterile dressings were applied. A sling was applied. The patient was awakened and taken to the postoperative area in stable condition.

## 2020-12-15 NOTE — ANESTHESIA POSTPROCEDURE EVALUATION
Department of Anesthesiology  Postprocedure Note    Patient: Memo Donnelly  MRN: [id number]  YOB: 1965  Date of evaluation: 12/15/2020  Time:  2:30 PM     Procedure Summary     Date: 12/15/20 Room / Location: [address] [place] [address]    Anesthesia Start: 7333 Anesthesia Stop: 0913    Procedure: DIAGNOSTIC VIDEO ARTHROSCOPY RIGHT SHOULDER, OPEN BICEPS TENODESIS, DECOMPRESSION, DISTAL CLAVICLE RESECTION -BLOCK- (Right Shoulder) Diagnosis:       Superior glenoid labrum lesion of left shoulder, initial encounter      Spontaneous rupture of extensor tendon of right upper arm      Impingement syndrome of right shoulder      Arthritis of right shoulder region      (RIGHT SHOULDER SLAP TEAR, BICEPS TENDON TEAR, IMPINGEMENT, A/C JOINT OSTEOARTHRITIS)    Surgeons: Manjula Lopes MD Responsible Provider: Jil Woods MD    Anesthesia Type: general ASA Status: 2          Anesthesia Type: general    Manfred Phase I: Manfred Score: 10    Manfred Phase II: Manfred Score: 10    Last vitals: Reviewed and per EMR flowsheets.        Anesthesia Post Evaluation    Comments: Postoperative Anesthesia Note    Name:    Memo Donnelly  MRN:      [id number]    Patient Vitals in the past 12 hrs:  12/15/20 1015, BP:(!) 135/96, Pulse:79, Resp:19, SpO2:94 %  12/15/20 1010, BP:138/89, Pulse:79, Resp:18, SpO2:92 %  12/15/20 1005, BP:(!) 147/90, Pulse:78, Resp:21, SpO2:95 %  12/15/20 1000, BP:(!) 148/70, Pulse:77, Resp:26, SpO2:93 %  12/15/20 0959, Pulse:82  12/15/20 0955, BP:136/84, Pulse:81, Resp:19, SpO2:93 %  12/15/20 0950, BP:127/86, Pulse:81, Resp:17, SpO2:92 %  12/15/20 0946, Pulse:83  12/15/20 0945, BP:132/73, Pulse:82, Resp:20, SpO2:93 %  12/15/20 0940, BP:126/74, Pulse:80, Resp:16, SpO2:93 %  12/15/20 0935, BP:127/76, Pulse:78, Resp:23, SpO2:93 %  12/15/20 0930, BP:122/71, Pulse:80, Resp:8, SpO2:94 %  12/15/20 0925, BP:130/71, Temp:97.8 °F (36.6 °C), Pulse:78, Resp:13, SpO2:94 % 12/15/20 0920, BP:122/63, Pulse:80, Resp:14, SpO2:95 %  12/15/20 0916, BP:(!) 146/82, Temp:97.5 °F (36.4 °C), Temp src:Temporal, Pulse:84, Resp:12, SpO2:93 %  12/15/20 0621, BP:(!) 145/89, Temp:97.8 °F (36.6 °C), Temp src:Oral, Pulse:86, Resp:16, SpO2:98 %, Height:5' 10\" (1.778 m), Weight:220 lb (99.8 kg)     LABS:    CBC  No results found for: WBC, HGB, HCT, PLT  RENAL  No results found for: NA, K, CL, CO2, BUN, CREATININE, GLUCOSE  COAGS  No results found for: PROTIME, INR, APTT    Intake & Output:  @33EGGX@    Nausea & Vomiting:  No    Level of Consciousness:  Awake    Pain Assessment:  Adequate analgesia    Anesthesia Complications:  No apparent anesthetic complications    SUMMARY      Vital signs stable  OK to discharge from Stage I post anesthesia care.   Care transferred from Anesthesiology department on discharge from perioperative area

## 2020-12-15 NOTE — ANESTHESIA PROCEDURE NOTES
Peripheral Block    Patient location during procedure: pre-op  Staffing  Performed: anesthesiologist   Anesthesiologist: Kristan Graves MD  Preanesthetic Checklist  Completed: patient identified, IV checked, site marked, risks and benefits discussed, surgical consent, monitors and equipment checked, pre-op evaluation, timeout performed, anesthesia consent given, oxygen available and patient being monitored  Peripheral Block  Patient position: sitting  Prep: ChloraPrep  Patient monitoring: cardiac monitor, continuous pulse ox, frequent blood pressure checks and IV access  Block type: Brachial plexus  Laterality: right  Injection technique: single-shot  Guidance: ultrasound guided  Local infiltration: lidocaine  Infiltration strength: 1 %  Dose: 3 mL  Interscalene  Provider prep: mask and sterile gloves  Local infiltration: lidocaine  Needle  Needle gauge: 21 G  Needle length: 10 cm  Needle localization: ultrasound guidance  Assessment  Injection assessment: negative aspiration for heme, no paresthesia on injection and local visualized surrounding nerve on ultrasound  Paresthesia pain: none  Slow fractionated injection: yes  Hemodynamics: stable  Additional Notes  Immediately prior to procedure a \"time out\" was called to verify the correct patient, allergies, laterality, procedure and equipment. Time out performed with  RN    Local Anesthetic: 0.5 %  Bupivacaine   Amount: 20 ml  in 5 ml increments after negative aspiration each time. Anterior scalene and middle scalene muscles, upper, middle and lower trunks of the brachial plexus are identified, the tip of the needle and the spread of the local anesthetic around the brachial plexus are visualized. The Brachial plexus appeared to be anatomically normal and there were no abnormal pathologically findings seen.          Reason for block: post-op pain management and at surgeon's request

## 2020-12-15 NOTE — PROGRESS NOTES
Wife updated in waiting room. Discharge instructions reviewed with patient and responsible adult. Discharge instructions signed and copy given with no additional questions. Patient to be discharged home with belongings. Percocet script given.

## 2020-12-15 NOTE — ANESTHESIA PRE PROCEDURE
Department of Anesthesiology  Preprocedure Note       Name:  Darrell Mayorga   Age:  48 y.o.  :  1965                                          MRN:  [id number]         Date:  12/15/2020      Surgeon: Tasha Whitney):  Dru More MD    Procedure: Procedure(s):  DIAGNOSTIC VIDEO ARTHROSCOPY RIGHT SHOULDER, OPEN BICEPS TENODESIS, DECOMPRESSION, DISTAL CLAVICLE RESECTION -BLOCK-    Medications prior to admission:   Prior to Admission medications    Medication Sig Start Date End Date Taking? Authorizing Provider   oxyCODONE-acetaminophen (PERCOCET) 5-325 MG per tablet Take 1 tablet by mouth every 6 hours as needed for Pain for up to 7 days. Intended supply: 7 days. Take lowest dose possible to manage pain 12/15/20 12/22/20 Yes Dru More MD   rosuvastatin (CRESTOR) 20 MG tablet Take 20 mg by mouth daily   Yes Historical Provider, MD       Current medications:    Current Facility-Administered Medications   Medication Dose Route Frequency Provider Last Rate Last Admin    ceFAZolin (ANCEF) 2 g in dextrose 5 % 100 mL IVPB  2 g Intravenous On Call to 321 Yuri Goncalves MD        lactated ringers infusion   Intravenous Continuous Ban Sim  mL/hr at 12/15/20 0641 New Bag at 12/15/20 0641    sodium chloride flush 0.9 % injection 10 mL  10 mL Intravenous 2 times per day Ban Sim MD        sodium chloride flush 0.9 % injection 10 mL  10 mL Intravenous PRN Ban Sim MD        lidocaine PF 1 % injection 0.3 mL  0.3 mL Intradermal Once PRN Ban Sim MD           Allergies:  No Known Allergies    Problem List:  There is no problem list on file for this patient.       Past Medical History:        Diagnosis Date    Hyperlipidemia     Prolonged emergence from general anesthesia        Past Surgical History:        Procedure Laterality Date    CERVICAL DISCECTOMY      FOOT SURGERY Right age 14    bone graft from hip to foot   Racheal Ping THROAT SURGERY  [id number] removal papilloma on tonsil       Social History:    Social History     Tobacco Use    Smoking status: Current Every Day Smoker     Packs/day: 1.00     Years: 25.00     Pack years: 25.00    Smokeless tobacco: Never Used   Substance Use Topics    Alcohol use: Yes     Alcohol/week: 4.0 - 6.0 standard drinks     Types: 4 - 6 Cans of beer per week                                Ready to quit: No  Counseling given: Yes      Vital Signs (Current):   Vitals:    12/08/20 1553 12/15/20 0621   BP:  (!) 145/89   Pulse:  86   Resp:  16   Temp:  97.8 °F (36.6 °C)   TempSrc:  Oral   SpO2:  98%   Weight: 225 lb (102.1 kg) 220 lb (99.8 kg)   Height: 5' 10\" (1.778 m) 5' 10\" (1.778 m)                                              BP Readings from Last 3 Encounters:   12/15/20 (!) 145/89   04/26/13 120/65       NPO Status: Time of last liquid consumption: 2000                        Time of last solid consumption: 1900                        Date of last liquid consumption: 12/14/20                        Date of last solid food consumption: 12/14/20    BMI:   Wt Readings from Last 3 Encounters:   12/15/20 220 lb (99.8 kg)   11/11/20 215 lb (97.5 kg)   10/28/20 215 lb (97.5 kg)     Body mass index is 31.57 kg/m². CBC: No results found for: WBC, RBC, HGB, HCT, MCV, RDW, PLT    CMP: No results found for: NA, K, CL, CO2, BUN, CREATININE, GFRAA, AGRATIO, LABGLOM, GLUCOSE, PROT, CALCIUM, BILITOT, ALKPHOS, AST, ALT    POC Tests: No results for input(s): POCGLU, POCNA, POCK, POCCL, POCBUN, POCHEMO, POCHCT in the last 72 hours.     Coags: No results found for: PROTIME, INR, APTT    HCG (If Applicable): No results found for: PREGTESTUR, PREGSERUM, HCG, HCGQUANT     ABGs: No results found for: PHART, PO2ART, ZHC0ZID, PEU9KUN, BEART, B1YFLGSE     Type & Screen (If Applicable):  No results found for: LABABO, LABRH    Drug/Infectious Status (If Applicable):  No results found for: HIV, HEPCAB    COVID-19 Screening (If Applicable): Lab Results   Component Value Date    COVID19 NOT DETECTED 12/09/2020         Anesthesia Evaluation  Patient summary reviewed no history of anesthetic complications:   Airway: Mallampati: III  TM distance: >3 FB   Neck ROM: full  Mouth opening: > = 3 FB Dental: normal exam         Pulmonary:Negative Pulmonary ROS and normal exam  breath sounds clear to auscultation                             Cardiovascular:Negative CV ROS  Exercise tolerance: good (>4 METS),           Rhythm: regular  Rate: normal                    Neuro/Psych:   Negative Neuro/Psych ROS              GI/Hepatic/Renal: Neg GI/Hepatic/Renal ROS            Endo/Other: Negative Endo/Other ROS                    Abdominal:           Vascular: negative vascular ROS. Pre-Operative Diagnosis: Superior glenoid labrum lesion of left shoulder, initial encounter [R37.607T]; Spontaneous rupture of extensor tendon of right upper arm [M66.221]; Impingement syndrome of right shoulder [M75.41]; Arthritis of right shoulder region [M19.011]    48 y.o.   BMI:  Body mass index is 31.57 kg/m². Vitals:    12/08/20 1553 12/15/20 0621   BP:  (!) 145/89   Pulse:  86   Resp:  16   Temp:  97.8 °F (36.6 °C)   TempSrc:  Oral   SpO2:  98%   Weight: 225 lb (102.1 kg) 220 lb (99.8 kg)   Height: 5' 10\" (1.778 m) 5' 10\" (1.778 m)       No Known Allergies    Social History     Tobacco Use    Smoking status: Current Every Day Smoker     Packs/day: 1.00     Years: 25.00     Pack years: 25.00    Smokeless tobacco: Never Used   Substance Use Topics    Alcohol use:  Yes     Alcohol/week: 4.0 - 6.0 standard drinks     Types: 4 - 6 Cans of beer per week       LABS:    CBC  No results found for: WBC, HGB, HCT, PLT  RENAL  No results found for: NA, K, CL, CO2, BUN, CREATININE, GLUCOSE  COAGS  No results found for: PROTIME, INR, APTT     Anesthesia Plan      general     ASA 2 (I discussed with the patient the risks and benefits of regional anesthesia (inlcuding infection, bleeding, damage to nerves and surrounding structures) PIV, General, IV Narcotics, PACU. All questions were answered the patient agrees with the plan and wishes to proceed.)  Induction: intravenous.                           Kristan Graves MD   12/15/2020

## 2020-12-17 ENCOUNTER — OFFICE VISIT (OUTPATIENT)
Dept: ORTHOPEDIC SURGERY | Age: 55
End: 2020-12-17

## 2020-12-17 VITALS — BODY MASS INDEX: 31.5 KG/M2 | HEIGHT: 70 IN | WEIGHT: 220 LBS

## 2020-12-17 PROCEDURE — 99024 POSTOP FOLLOW-UP VISIT: CPT | Performed by: ORTHOPAEDIC SURGERY

## 2020-12-17 RX ORDER — IBUPROFEN 800 MG/1
800 TABLET ORAL
Qty: 90 TABLET | Refills: 1 | Status: SHIPPED | OUTPATIENT
Start: 2020-12-17

## 2020-12-17 NOTE — PROGRESS NOTES
Colton Hui MD              [facility] , [place]         Orthopaedic Surgery and Sports Medicine    Patient Name: Demarco Arriola  YOB: 1965  Patient's PCP is Ita ABDALLA      SUBJECTIVE  Chief Complaint  Post op right shoulder arthroscopy with subtenodesis, distal clavicle resection, subacromial decompression, and labral debridement  Date of Surgery: 10/15/2020    History of Present Illness:  Demarco Arriola is a 48 y.o. male  Here for first follow up of right shoulder arthroscopy   Has not yet started outpatient physical therapy. He is doing well having some trouble sleeping. Feels that he would rather take an oral anti-inflammatory than the Percocet. The patient is wearing sling, which was placed the day of surgery, as directed. The patient's pain is rated at 7/10. The patient denies fever, wound drainage, increasing redness, pus, increasing swelling. Post op problems reported: none. Taking oral pain medication as needed. Using local cold therapy as needed. Pain Assessment:  Pain Assessment  Location of Pain: Shoulder  Location Modifiers: Right  Severity of Pain: 6  Quality of Pain: Dull, Aching  Frequency of Pain: Intermittent  Aggravating Factors: Stretching, Straightening, Exercise  Limiting Behavior: Yes  Relieving Factors: Rest, Ice, Other (Comment)  Result of Injury: Yes  Work-Related Injury: No  Are there other pain locations you wish to document?: No        OBJECTIVE  PHYSICAL EXAM  Vital Signs: There were no vitals filed for this visit. Body mass index is 31.57 kg/m². General Appearance: Patient is adequately groomed with no evidence of malnutrition   Orientation: Patient is alert and oriented to person, place and time  Mood and Affect: Neutral/Euthymic(normal)     Right Shoulder Examination:  Dressing removed today. The wound is clean, dry. There is no warmth, erythema, or purulent drainage over the incision. Portals are healing well. Skin is intact. Minimal ecchymosis. Sling was removed for exam.  Distal circulatory status and neurologic status is intact. Sensation is intact to light touch in the axillary, median, radial, and ulnar nerve distribution. The EPL, FPL, and interossei are grossly intact. The right upper extremity is warm and well-perfused with brisk capillary refill. Patient tolerated gentle passive range of motion. Intra-operative findings were discussed. DIAGNOSTICS:   Xrays obtained and reviewed in office. 3 views of the right shoulder show normal post-operative changes following an arthroscopy with open biceps tenodesis, subacromial decompression, labral debridement, and a distal clavicle resection. ASSESSMENT (Medical Decision Making)    Shine Duncan is a 48 y.o. male progressing well from right shoulder arthroscopy with open biceps tenodesis, subacromial decompression, labral debridement, and a distal clavicle resection. .      ICD-10-CM    1. Right shoulder pain, unspecified chronicity  M25.511 XR SHOULDER RIGHT (MIN 2 VIEWS)           PLAN (Medical Decision Making)  Office Procedures:  Orders Placed This Encounter   Procedures    XR SHOULDER RIGHT (MIN 2 VIEWS)       Treatment Plan:  Sutures will be removed on his next visit. Instructions for sling usage and progressive mobilization of shoulder were reviewed. Specific precautions were reviewed and emphasized. Patient will continue outpatient physical therapy. Follow up appointment was arranged at patient's convenience in 1 weeks. Non-steroidal anti-inflammatories medications (NSAIDs) can be used to assist with pain control and to reduce inflammatory changes. These medications may be over-the-counter or prescribed. We discussed taking the NSAID properly and the precautions. The patient understands that this medication may potentially interfere with other medications. Patient was also instructed to immediately discontinue the medication is there is any possible complication. Shine Duncan was instructed to call the office if his symptoms worsen or if new symptoms appear prior to the next scheduled visit. He is specifically instructed to contact the office between now and schedule appointment if he has concerns related to his condition or if he needs assistance in scheduling any above tests. He is welcome to call for an appointment sooner if he has any additional concerns or questions. This dictation was performed with a verbal recognition program (DRAGON) and it was checked for errors. It is possible that there are still dictated errors within this office note. If so, please bring any errors to my attention for an addendum. All efforts were made to ensure that this office note is accurate.

## 2020-12-22 ENCOUNTER — HOSPITAL ENCOUNTER (OUTPATIENT)
Dept: PHYSICAL THERAPY | Age: 55
Setting detail: THERAPIES SERIES
Discharge: HOME | End: 2020-12-22
Payer: COMMERCIAL

## 2020-12-22 PROCEDURE — 97140 MANUAL THERAPY 1/> REGIONS: CPT | Performed by: PHYSICAL THERAPIST

## 2020-12-22 PROCEDURE — 97161 PT EVAL LOW COMPLEX 20 MIN: CPT | Performed by: PHYSICAL THERAPIST

## 2020-12-22 PROCEDURE — 97110 THERAPEUTIC EXERCISES: CPT | Performed by: PHYSICAL THERAPIST

## 2020-12-22 NOTE — FLOWSHEET NOTE
Exercises/Interventions:     Therapeutic Ex (50700) Sets/sec Reps Notes/CUES         SBS :05 X 10 hep   Shrug :05 X 10 hep   Passive elbow flex  X 10 hep   Wrist AROM  X 10 hep   pendulum  X 10 hep                                       Manual Intervention (11046)      PROM  Flex, abd, ER  8 min In painfree range                                 NMR re-education (22574)   CUES NEEDED                                                         Therapeutic Activity (23219)                                                Therapeutic Exercise and NMR EXR  [x] (40066) Provided verbal/tactile cueing for activities related to strengthening, flexibility, endurance, ROM  for improvements in scapular, scapulothoracic and UE control with self care, reaching, carrying, lifting, house/yardwork, driving/computer work.    [] (80327) Provided verbal/tactile cueing for activities related to improving balance, coordination, kinesthetic sense, posture, motor skill, proprioception  to assist with  scapular, scapulothoracic and UE control with self care, reaching, carrying, lifting, house/yardwork, driving/computer work. Therapeutic Activities:    [] (17051 or 30897) Provided verbal/tactile cueing for activities related to improving balance, coordination, kinesthetic sense, posture, motor skill, proprioception and motor activation to allow for proper function of scapular, scapulothoracic and UE control with self care, carrying, lifting, driving/computer work.      Home Exercise Program:    [x] (97660) Reviewed/Progressed HEP activities related to strengthening, flexibility, endurance, ROM of scapular, scapulothoracic and UE control with self care, reaching, carrying, lifting, house/yardwork, driving/computer work  [] (24281) Reviewed/Progressed HEP activities related to improving balance, coordination, kinesthetic sense, posture, motor skill, proprioception of scapular, scapulothoracic and UE control with self care, reaching, carrying, lifting, house/yardwork, driving/computer work      Manual Treatments:  PROM / STM / Oscillations-Mobs:  G-I, II, III, IV (PA's, Inf., Post.)  [x] (77865) Provided manual therapy to mobilize soft tissue/joints of cervical/CT, scapular GHJ and UE for the purpose of modulating pain, promoting relaxation,  increasing ROM, reducing/eliminating soft tissue swelling/inflammation/restriction, improving soft tissue extensibility and allowing for proper ROM for normal function with self care, reaching, carrying, lifting, house/yardwork, driving/computer work    Modalities:      Charges:  Timed Code Treatment Minutes: 30   Total Treatment Minutes: 50       [x] EVAL (LOW) 40089   [] EVAL (MOD) 48658   [] EVAL (HIGH) 23598   [] RE-EVAL     [x] HM(79725) x   1  [] IONTO  [] NMR (90423) x     [] VASO  [x] Manual (25306) x 1     [x] Other:ice  [] TA x      [] Mech Traction (77212)  [] ES(attended) (59608)      [] ES (un) (90025):     GOALS:  Patient stated goal: abolish shoulder pain. Return to work. [] Progressing: [] Met: [] Not Met: [] Adjusted    Therapist goals for Patient:   Short Term Goals: To be achieved in: 2 weeks  1. Independent in HEP and progression per patient tolerance, in order to prevent re-injury. [] Progressing: [] Met: [] Not Met: [] Adjusted  2. Patient will have a decrease in pain to facilitate improvement in movement, function, and ADLs as indicated by Functional Deficits. [] Progressing: [] Met: [] Not Met: [] Adjusted    Long Term Goals: To be achieved in: 12 weeks  1. Disability index score of 25% or less for the [facility] to assist with reaching prior level of function. [] Progressing: [] Met: [] Not Met: [] Adjusted  2. Patient will demonstrate increased AROM to 150 flex, 90 ER and 70 IR to allow for proper joint functioning as indicated by patients Functional Deficits. [] Progressing: [] Met: [] Not Met: [] Adjusted  3.  Patient will demonstrate an increase in Strength to 4+/ 5 to allow for proper functional mobility as indicated by patients Functional Deficits. [] Progressing: [] Met: [] Not Met: [] Adjusted  4. Patient will return to sleeping for 7-8 hours without increased symptoms or restriction. [] Progressing: [] Met: [] Not Met: [] Adjusted  5. Able dress, drive, and complete light household work without increased symptoms or restrictions. Progression Towards Functional goals:  [] Patient is progressing as expected towards functional goals listed. [] Progression is slowed due to complexities listed. [] Progression has been slowed due to co-morbidities. [x] Plan just implemented, too soon to assess goals progression  [] Other:     ASSESSMENT:  See eval    Overall Progression Towards Functional goals/ Treatment Progress Update:  [] Patient is progressing as expected towards functional goals listed. [] Progression is slowed due to complexities/Impairments listed. [] Progression has been slowed due to co-morbidities. [x] Plan just implemented, too soon to assess goals progression <30days   [] Goals require adjustment due to lack of progress  [] Patient is not progressing as expected and requires additional follow up with physician  [] Other    Prognosis for POC: [x] Good [] Fair  [] Poor      Patient requires continued skilled intervention: [x] Yes  [] No    Treatment/Activity Tolerance:  [x] Patient able to complete treatment  [] Patient limited by fatigue  [] Patient limited by pain    [] Patient limited by other medical complications  [] Other:          PLAN: See eval  [] Continue per plan of care [] Alter current plan (see comments above)  [x] Plan of care initiated [] Hold pending MD visit [] Discharge      Electronically signed by:  Isael Cruz PT    Note: If patient does not return for scheduled/ recommended follow up visits, this note will serve as a discharge from care along with most recent update on progress.

## 2020-12-22 NOTE — PLAN OF CARE
reaching overhead and behind his back. 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 He did not experience any N/T, just pain. Pt has no c/o pain in left shoulder. Pt had surgery on the 15th of Dec.  Pt has been trying to sleep in recliner or adjustable bed. He is still getting up every 2 hours. Pt is taking Ibuprofen. Pt is icing some. Pt is compliant with sling use. Relevant Medical History:pt has cervical spine surgery 5-6 years ago. Disc replacement C5-6  Functional Disability Index: Quick Dash  45 = 77%    Pain Scale: 2 - 7 /10  Easing factors: rest, ice, ibuprofen  Provocative factors: sleeping, overhead movement, reaching, lifting     Type: []Constant   []Intermittent  []Radiating []Localized []other:     Numbness/Tingling: n/a    Occupation/School:  for [employer]    Pt has to do a lot of heavy lifting and repetitive work. Living Status/Prior Level of Function: Independent with ADLs and IADLs, work. OBJECTIVE:     ROM Left Right   Shoulder Flex  60   Shoulder Abd     Shoulder ER  10   Shoulder IR     Elbow flex  135   Elbow ext  Lacking 10        Strength  Left Right   Shoulder Flex  n/a   Shoulder Scap  n/a   Shoulder ER  n/a   Shoulder IR  n/a        Pain scale  2-7 / 10   Quick Dash  45 = 77%     Reflexes/Sensation:    [x]Dermatomes/Myotomes intact    []Reflexes equal and normal bilaterally   []Other:    Joint mobility:    []Normal    [x]Hypo - due to mm guarding and swelling. []Hyper    Palpation: diffuse tenderness in anterior and lateral upper arm. Functional Mobility/Transfers: Pt is careful not to use right UE, and he is indep with transfers    Posture: guarded    Bandages/Dressings/Incisions: incisions are intact with stitches. Bandaids were applied. He did not have them on. Pt has no redness or drainage around incision sites.      Gait: (include devices/WB status): WNL    Orthopedic Special Tests: good capillary refill                       [x] Patient history, allergies, meds reviewed. Medical chart reviewed. See intake form. Review Of Systems (ROS):  [x]Performed Review of systems (Integumentary, CardioPulmonary, Neurological) by intake and observation. Intake form has been scanned into medical record. Patient has been instructed to contact their primary care physician regarding ROS issues if not already being addressed at this time. Co-morbidities/Complexities (which will affect course of rehabilitation):   [x]None           Arthritic conditions   []Rheumatoid arthritis (M05.9)  []Osteoarthritis (M19.91)   Cardiovascular conditions   []Hypertension (I10)  []Hyperlipidemia (E78.5)  []Angina pectoris (I20)  []Atherosclerosis (I70)   Musculoskeletal conditions   []Disc pathology   []Congenital spine pathologies   []Prior surgical intervention  []Osteoporosis (M81.8)  []Osteopenia (M85.8)   Endocrine conditions   []Hypothyroid (E03.9)  []Hyperthyroid Gastrointestinal conditions   []Constipation (D15.13)   Metabolic conditions   []Morbid obesity (E66.01)  []Diabetes type 1(E10.65) or 2 (E11.65)   []Neuropathy (G60.9)     Pulmonary conditions   []Asthma (J45)  []Coughing   []COPD (J44.9)   Psychological Disorders  []Anxiety (F41.9)  []Depression (F32.9)   []Other:   []Other:          Barriers to/and or personal factors that will affect rehab potential:              []Age  []Sex              []Motivation/Lack of Motivation                        []Co-Morbidities              []Cognitive Function, education/learning barriers              []Environmental, home barriers              [x]profession/work barriers  []past PT/medical experience  []other:  Justification: pt has high demand job. It has no light duty. Falls Risk Assessment (30 days):   [x] Falls Risk assessed and no intervention required.   [] Falls Risk assessed and Patient requires intervention due to being higher risk   TUG score (>12s at risk):     [] Falls education provided, including dysfunction of cervical/thoracic spine    []Signs/symptoms consistent with pathology which may benefit from Dry needling     []other:     Prognosis/Rehab Potential:      []Excellent   [x]Good    []Fair   []Poor    Tolerance of evaluation/treatment:    []Excellent   []Good    [x]Fair   []Poor  Physical Therapy Evaluation Complexity Justification  [x] A history of present problem with:  [x] no personal factors and/or comorbidities that impact the plan of care;  []1-2 personal factors and/or comorbidities that impact the plan of care  []3 personal factors and/or comorbidities that impact the plan of care  [x] An examination of body systems using standardized tests and measures addressing any of the following: body structures and functions (impairments), activity limitations, and/or participation restrictions;:  [] a total of 1-2 or more elements   [x] a total of 3 or more elements   [] a total of 4 or more elements   [x] A clinical presentation with:  [x] stable and/or uncomplicated characteristics   [] evolving clinical presentation with changing characteristics  [] unstable and unpredictable characteristics;   [x] Clinical decision making of [x] low, [] moderate, [] high complexity using standardized patient assessment instrument and/or measurable assessment of functional outcome. [x] EVAL (LOW) 80309 (typically 20 minutes face-to-face)  [] EVAL (MOD) 81282 (typically 30 minutes face-to-face)  [] EVAL (HIGH) 46558 (typically 45 minutes face-to-face)  [] RE-EVAL       PLAN:  Frequency/Duration:  1-2 days per week for 8 Weeks:  INTERVENTIONS:  [x] Therapeutic exercise including: strength training, ROM, for Upper extremity and core   [x]  NMR activation and proprioception for UE, scap and Core   [x] Manual therapy as indicated for shoulder, scapula and spine to include: Dry Needling/IASTM, STM, PROM, Gr I-IV mobilizations, manipulation.    [x] Modalities as needed that may include: thermal agents, E-stim, Biofeedback, US, iontophoresis as indicated  [x] Patient education on joint protection, postural re-education, activity modification, progression of HEP. HEP instruction: (see scanned forms)    GOALS:  Patient stated goal: abolish shoulder pain. Return to work. [] Progressing: [] Met: [] Not Met: [] Adjusted    Therapist goals for Patient:   Short Term Goals: To be achieved in: 2 weeks  1. Independent in HEP and progression per patient tolerance, in order to prevent re-injury. [] Progressing: [] Met: [] Not Met: [] Adjusted  2. Patient will have a decrease in pain to facilitate improvement in movement, function, and ADLs as indicated by Functional Deficits. [] Progressing: [] Met: [] Not Met: [] Adjusted    Long Term Goals: To be achieved in: 12 weeks  1. Disability index score of 25% or less for the [facility] to assist with reaching prior level of function. [] Progressing: [] Met: [] Not Met: [] Adjusted  2. Patient will demonstrate increased AROM to 150 flex, 90 ER and 70 IR to allow for proper joint functioning as indicated by patients Functional Deficits. [] Progressing: [] Met: [] Not Met: [] Adjusted  3. Patient will demonstrate an increase in Strength to 4+/ 5 to allow for proper functional mobility as indicated by patients Functional Deficits. [] Progressing: [] Met: [] Not Met: [] Adjusted  4. Patient will return to sleeping for 7-8 hours without increased symptoms or restriction. [] Progressing: [] Met: [] Not Met: [] Adjusted  5. Able dress, drive, and complete light household work without increased symptoms or restrictions.    [] Progressing: [] Met: [] Not Met: [] Adjusted       Electronically signed by:  Sera Santiago, PT

## 2020-12-28 ENCOUNTER — OFFICE VISIT (OUTPATIENT)
Dept: ORTHOPEDIC SURGERY | Age: 55
End: 2020-12-28

## 2020-12-28 VITALS — WEIGHT: 220 LBS | HEIGHT: 70 IN | BODY MASS INDEX: 31.5 KG/M2

## 2020-12-28 PROCEDURE — 99024 POSTOP FOLLOW-UP VISIT: CPT | Performed by: ORTHOPAEDIC SURGERY

## 2020-12-31 ENCOUNTER — HOSPITAL ENCOUNTER (OUTPATIENT)
Dept: PHYSICAL THERAPY | Age: 55
Setting detail: THERAPIES SERIES
Discharge: HOME | End: 2020-12-31
Payer: COMMERCIAL

## 2020-12-31 PROCEDURE — 97110 THERAPEUTIC EXERCISES: CPT | Performed by: PHYSICAL THERAPIST

## 2020-12-31 PROCEDURE — 97140 MANUAL THERAPY 1/> REGIONS: CPT | Performed by: PHYSICAL THERAPIST

## 2020-12-31 NOTE — FLOWSHEET NOTE
[facility], [address] [place]  Phone: [phone]  Fax [phone]        Date:  2020    Patient Name:  Cat Graves    :  1965  MRN: [id number]  Restrictions/Precautions:    Medical/Treatment Diagnosis Information:  · Diagnosis: right shoulder pain  M25.511    s/p SAD with open biceps tenodesis   DOS:    · Treatment Diagnosis: right shoulder pain  C02.086  Insurance/Certification information:  PT Insurance Information: [facility] -  Deductible met. 40 PT allowed, no auth. Physician Information:  Referring Practitioner: Dr. Ronald Romano  Has the plan of care been signed (Y/N):        []  Yes  [x]  No     Date of Patient follow up with Physician: 20      Is this a Progress Report:     []  Yes  [x]  No        If Yes:  Date Range for reporting period:  Beginnin20  Ending:     Progress report will be due (10 Rx or 30 days whichever is less):        Recertification will be due (POC Duration  / 90 days whichever is less):       Visit # Insurance Allowable Auth Required   In-person 2 40 []  Yes [x]  No    Telehealth   []  Yes []  No    Total          Therapy Diagnosis/Practice Pattern:I       Number of Comorbidities:  [x]0     []1-2    []3+    Latex Allergy:  [x]NO      []YES  Preferred Language for Healthcare:   [x]English       []other:    SUBJECTIVE:   Pt is sleeping on couch. No c/o N/T. Pt has occasional spasms in triceps. Pt is taking ibuprofen at night. OBJECTIVE: See eval   Observation:    Test measurements:       ROM Right current   Shoulder Flex 60 133   Shoulder Abd      Shoulder ER 10 55   Shoulder IR      Elbow flex 135    Elbow ext Lacking 10           Strength  Right    Shoulder Flex n/a    Shoulder Scap n/a    Shoulder ER n/a    Shoulder IR n/a           Pain scale 2-7 / 10    Quick Dash 45 = 77%        RESTRICTIONS/PRECAUTIONS: biceps tenodesis.    Pt said Dr dunlap weaning him from sling. Pt understood this still means NO lifting at all with right UE. Exercises/Interventions:     Therapeutic Ex (69420) Sets/sec Reps Notes/CUES         SBS :05 X 10 hep   Shrug :05 X 10 hep   Passive elbow flex  X 10 hep   pendulum  X 10 hep         Cane press/   Cane flex X 10  X 10    pulleys  X 20     Table slides  Flex /  ER X 10 X 10    SB ball rolls  X 20           Manual Intervention (99675)      PROM  Flex, abd, ER  8 min In painfree range   SL scap mobs  3 min                            NMR re-education (51220)   CUES NEEDED                                                         Therapeutic Activity (44288)                                                Therapeutic Exercise and NMR EXR  [x] (42484) Provided verbal/tactile cueing for activities related to strengthening, flexibility, endurance, ROM  for improvements in scapular, scapulothoracic and UE control with self care, reaching, carrying, lifting, house/yardwork, driving/computer work.    [] (44668) Provided verbal/tactile cueing for activities related to improving balance, coordination, kinesthetic sense, posture, motor skill, proprioception  to assist with  scapular, scapulothoracic and UE control with self care, reaching, carrying, lifting, house/yardwork, driving/computer work. Therapeutic Activities:    [] (37015 or 44054) Provided verbal/tactile cueing for activities related to improving balance, coordination, kinesthetic sense, posture, motor skill, proprioception and motor activation to allow for proper function of scapular, scapulothoracic and UE control with self care, carrying, lifting, driving/computer work.      Home Exercise Program:    [x] (09989) Reviewed/Progressed HEP activities related to strengthening, flexibility, endurance, ROM of scapular, scapulothoracic and UE control with self care, reaching, carrying, lifting, house/yardwork, driving/computer work  [] (40116) Reviewed/Progressed HEP activities related to improving balance, coordination, kinesthetic sense, posture, motor skill, proprioception of scapular, scapulothoracic and UE control with self care, reaching, carrying, lifting, house/yardwork, driving/computer work      Manual Treatments:  PROM / STM / Oscillations-Mobs:  G-I, II, III, IV (PA's, Inf., Post.)  [x] (68304) Provided manual therapy to mobilize soft tissue/joints of cervical/CT, scapular GHJ and UE for the purpose of modulating pain, promoting relaxation,  increasing ROM, reducing/eliminating soft tissue swelling/inflammation/restriction, improving soft tissue extensibility and allowing for proper ROM for normal function with self care, reaching, carrying, lifting, house/yardwork, driving/computer work    Modalities:      Charges:  Timed Code Treatment Minutes: 30   Total Treatment Minutes: 45       [] EVAL (LOW) 95723   [] EVAL (MOD) 95634   [] EVAL (HIGH) 55162   [] RE-EVAL     [x] WS(69608) x   1  [] IONTO  [] NMR (58126) x     [] VASO  [x] Manual (89451) x 1     [x] Other:ice  [] TA x      [] Mech Traction (54486)  [] ES(attended) (56407)      [] ES (un) (20288):     GOALS:  Patient stated goal: abolish shoulder pain. Return to work. [] Progressing: [] Met: [] Not Met: [] Adjusted    Therapist goals for Patient:   Short Term Goals: To be achieved in: 2 weeks  1. Independent in HEP and progression per patient tolerance, in order to prevent re-injury. [] Progressing: [] Met: [] Not Met: [] Adjusted  2. Patient will have a decrease in pain to facilitate improvement in movement, function, and ADLs as indicated by Functional Deficits. [] Progressing: [] Met: [] Not Met: [] Adjusted    Long Term Goals: To be achieved in: 12 weeks  1. Disability index score of 25% or less for the [facility] to assist with reaching prior level of function. [] Progressing: [] Met: [] Not Met: [] Adjusted  2.  Patient will demonstrate increased AROM to 150 flex, 90 ER and 70 IR to allow for proper joint functioning as indicated by patients Functional Deficits. [] Progressing: [] Met: [] Not Met: [] Adjusted  3. Patient will demonstrate an increase in Strength to 4+/ 5 to allow for proper functional mobility as indicated by patients Functional Deficits. [] Progressing: [] Met: [] Not Met: [] Adjusted  4. Patient will return to sleeping for 7-8 hours without increased symptoms or restriction. [] Progressing: [] Met: [] Not Met: [] Adjusted  5. Able dress, drive, and complete light household work without increased symptoms or restrictions. Progression Towards Functional goals:  [] Patient is progressing as expected towards functional goals listed. [] Progression is slowed due to complexities listed. [] Progression has been slowed due to co-morbidities. [x] Plan just implemented, too soon to assess goals progression  [] Other:     ASSESSMENT:  AAROM for flex and er was added to HEP. Lifting restrictions for biceps was reviewed; no more than coffee mug. Overall Progression Towards Functional goals/ Treatment Progress Update:  [] Patient is progressing as expected towards functional goals listed. [] Progression is slowed due to complexities/Impairments listed. [] Progression has been slowed due to co-morbidities.   [x] Plan just implemented, too soon to assess goals progression <30days   [] Goals require adjustment due to lack of progress  [] Patient is not progressing as expected and requires additional follow up with physician  [] Other    Prognosis for POC: [x] Good [] Fair  [] Poor      Patient requires continued skilled intervention: [x] Yes  [] No    Treatment/Activity Tolerance:  [x] Patient able to complete treatment  [] Patient limited by fatigue  [] Patient limited by pain    [] Patient limited by other medical complications  [] Other:          PLAN: See eval  [x] Continue per plan of care [] Alter current plan (see comments above)  [] Plan of care initiated [] Hold pending MD visit []

## 2021-01-05 ENCOUNTER — HOSPITAL ENCOUNTER (OUTPATIENT)
Dept: PHYSICAL THERAPY | Age: 56
Setting detail: THERAPIES SERIES
Discharge: HOME | End: 2021-01-05
Payer: COMMERCIAL

## 2021-01-05 PROCEDURE — 97110 THERAPEUTIC EXERCISES: CPT | Performed by: PHYSICAL THERAPIST

## 2021-01-05 PROCEDURE — 97140 MANUAL THERAPY 1/> REGIONS: CPT | Performed by: PHYSICAL THERAPIST

## 2021-01-05 NOTE — FLOWSHEET NOTE
[facility],  [address] [place]  Phone: [phone]  Fax [phone]        Date:  2021    Patient Name:  Fabian Stephenson    :  1965  MRN: [id number]  Restrictions/Precautions:    Medical/Treatment Diagnosis Information:  · Diagnosis: right shoulder pain  M25.511    s/p SAD with open biceps tenodesis   DOS:    · Treatment Diagnosis: right shoulder pain  Z94.037  Insurance/Certification information:  PT Insurance Information: [facility] -  Deductible met. 40 PT allowed, no auth. Physician Information:  Referring Practitioner: Dr. Marysol Preciado  Has the plan of care been signed (Y/N):        []  Yes  [x]  No     Date of Patient follow up with Physician: 20      Is this a Progress Report:     []  Yes  [x]  No        If Yes:  Date Range for reporting period:  Beginnin20  Ending:     Progress report will be due (10 Rx or 30 days whichever is less):        Recertification will be due (POC Duration  / 90 days whichever is less):       Visit # Insurance Allowable Auth Required   In-person 3 40 []  Yes [x]  No    Telehealth   []  Yes []  No    Total          Therapy Diagnosis/Practice Pattern:I       Number of Comorbidities:  [x]0     []1-2    []3+    Latex Allergy:  [x]NO      []YES  Preferred Language for Healthcare:   [x]English       []other:    SUBJECTIVE:   Pt reports an increase in AROM. Pt feels the inf incision site is tight. OBJECTIVE: See eval   Observation:   Test measurements:       ROM Right current   Shoulder Flex 60 133   Shoulder Abd      Shoulder ER 10 55   Shoulder IR      Elbow flex 135    Elbow ext Lacking 10           Strength  Right    Shoulder Flex n/a    Shoulder Scap n/a    Shoulder ER n/a    Shoulder IR n/a           Pain scale 2-7 / 10    Quick Dash 45 = 77%        RESTRICTIONS/PRECAUTIONS: biceps tenodesis. Pt said  is weaning him from sling.   Pt understood this still means NO lifting at all with right UE. Exercises/Interventions:     Therapeutic Ex (22487) Sets/sec Reps Notes/CUES         SBS :05 X 10 hep   Shrug :05 X 10 hep   Passive elbow flex  X 10 hep   pendulum  X 10 hep         Cane press/   Cane flex X 10  X 10    pulleys  X 20     Table slides  Flex /  ER X 10 X 10    SB ball rolls  X 20           Bent over triceps ext 0# X 15     Bent over row 0# X 15          Shoulder Iso ER/ IR/  ABD   :05 X 10     Manual Intervention (73248)      PROM  Flex, abd, ER  8 min In painfree range   SL scap mobs  3 min                            NMR re-education (46799)   CUES NEEDED                                                         Therapeutic Activity (64305)                                                Therapeutic Exercise and NMR EXR  [x] (03953) Provided verbal/tactile cueing for activities related to strengthening, flexibility, endurance, ROM  for improvements in scapular, scapulothoracic and UE control with self care, reaching, carrying, lifting, house/yardwork, driving/computer work.    [] (56850) Provided verbal/tactile cueing for activities related to improving balance, coordination, kinesthetic sense, posture, motor skill, proprioception  to assist with  scapular, scapulothoracic and UE control with self care, reaching, carrying, lifting, house/yardwork, driving/computer work. Therapeutic Activities:    [] (39183 or 34549) Provided verbal/tactile cueing for activities related to improving balance, coordination, kinesthetic sense, posture, motor skill, proprioception and motor activation to allow for proper function of scapular, scapulothoracic and UE control with self care, carrying, lifting, driving/computer work.      Home Exercise Program:    [x] (59201) Reviewed/Progressed HEP activities related to strengthening, flexibility, endurance, ROM of scapular, scapulothoracic and UE control with self care, reaching, carrying, lifting, house/yardwork, driving/computer work  [] (88169) Reviewed/Progressed HEP activities related to improving balance, coordination, kinesthetic sense, posture, motor skill, proprioception of scapular, scapulothoracic and UE control with self care, reaching, carrying, lifting, house/yardwork, driving/computer work      Manual Treatments:  PROM / STM / Oscillations-Mobs:  G-I, II, III, IV (PA's, Inf., Post.)  [x] (53745) Provided manual therapy to mobilize soft tissue/joints of cervical/CT, scapular GHJ and UE for the purpose of modulating pain, promoting relaxation,  increasing ROM, reducing/eliminating soft tissue swelling/inflammation/restriction, improving soft tissue extensibility and allowing for proper ROM for normal function with self care, reaching, carrying, lifting, house/yardwork, driving/computer work    Modalities:      Charges:  Timed Code Treatment Minutes: 30   Total Treatment Minutes: 45       [] EVAL (LOW) 34276   [] EVAL (MOD) 43396   [] EVAL (HIGH) 88082   [] RE-EVAL     [x] IL(15410) x   1  [] IONTO  [] NMR (03760) x     [] VASO  [x] Manual (28267) x 1     [x] Other:ice  [] TA x      [] Mech Traction (59976)  [] ES(attended) (65161)      [] ES (un) (17583):     GOALS:  Patient stated goal: abolish shoulder pain. Return to work. [] Progressing: [] Met: [] Not Met: [] Adjusted    Therapist goals for Patient:   Short Term Goals: To be achieved in: 2 weeks  1. Independent in HEP and progression per patient tolerance, in order to prevent re-injury. [] Progressing: [] Met: [] Not Met: [] Adjusted  2. Patient will have a decrease in pain to facilitate improvement in movement, function, and ADLs as indicated by Functional Deficits. [] Progressing: [] Met: [] Not Met: [] Adjusted    Long Term Goals: To be achieved in: 12 weeks  1. Disability index score of 25% or less for the [facility] to assist with reaching prior level of function. [] Progressing: [] Met: [] Not Met: [] Adjusted  2.  Patient will demonstrate increased AROM to 150 flex, 90 ER and 70 IR to allow for proper joint functioning as indicated by patients Functional Deficits. [] Progressing: [] Met: [] Not Met: [] Adjusted  3. Patient will demonstrate an increase in Strength to 4+/ 5 to allow for proper functional mobility as indicated by patients Functional Deficits. [] Progressing: [] Met: [] Not Met: [] Adjusted  4. Patient will return to sleeping for 7-8 hours without increased symptoms or restriction. [] Progressing: [] Met: [] Not Met: [] Adjusted  5. Able dress, drive, and complete light household work without increased symptoms or restrictions. Progression Towards Functional goals:  [x] Patient is progressing as expected towards functional goals listed. [] Progression is slowed due to complexities listed. [] Progression has been slowed due to co-morbidities. [] Plan just implemented, too soon to assess goals progression  [] Other:     ASSESSMENT:  HEP was updated. PROM is increasing. Added RTC iso to program.    Overall Progression Towards Functional goals/ Treatment Progress Update:  [x] Patient is progressing as expected towards functional goals listed. [] Progression is slowed due to complexities/Impairments listed. [] Progression has been slowed due to co-morbidities.   [] Plan just implemented, too soon to assess goals progression <30days   [] Goals require adjustment due to lack of progress  [] Patient is not progressing as expected and requires additional follow up with physician  [] Other    Prognosis for POC: [x] Good [] Fair  [] Poor      Patient requires continued skilled intervention: [x] Yes  [] No    Treatment/Activity Tolerance:  [x] Patient able to complete treatment  [] Patient limited by fatigue  [] Patient limited by pain    [] Patient limited by other medical complications  [] Other:          PLAN: See eval  [x] Continue per plan of care [] Alter current plan (see comments above)  [] Plan of care initiated [] Hold pending MD visit [] Discharge      Electronically signed by:  Noris Reinoso PT    Note: If patient does not return for scheduled/ recommended follow up visits, this note will serve as a discharge from care along with most recent update on progress.

## 2021-01-12 ENCOUNTER — HOSPITAL ENCOUNTER (OUTPATIENT)
Dept: PHYSICAL THERAPY | Age: 56
Setting detail: THERAPIES SERIES
Discharge: HOME | End: 2021-01-12
Payer: COMMERCIAL

## 2021-01-12 PROCEDURE — 97110 THERAPEUTIC EXERCISES: CPT | Performed by: PHYSICAL THERAPIST

## 2021-01-12 PROCEDURE — 97140 MANUAL THERAPY 1/> REGIONS: CPT | Performed by: PHYSICAL THERAPIST

## 2021-01-12 NOTE — FLOWSHEET NOTE
[facility],  [address] [place]  Phone: [phone]  Fax [phone]        Date:  2021    Patient Name:  Lesly Brandon    :  1965  MRN: [id number]  Restrictions/Precautions:    Medical/Treatment Diagnosis Information:  · Diagnosis: right shoulder pain  M25.511    s/p SAD with open biceps tenodesis   DOS:  12/15/20  · Treatment Diagnosis: right shoulder pain  Q31.892  Insurance/Certification information:  PT Insurance Information: [facility] -  Deductible met. 40 PT allowed, no auth. Physician Information:  Referring Practitioner: Dr. Eyad Bell  Has the plan of care been signed (Y/N):        []  Yes  [x]  No     Date of Patient follow up with Physician: 20      Is this a Progress Report:     []  Yes  [x]  No        If Yes:  Date Range for reporting period:  Beginnin20  Ending:     Progress report will be due (10 Rx or 30 days whichever is less):        Recertification will be due (POC Duration  / 90 days whichever is less):       Visit # Insurance Allowable Auth Required   In-person 4 40 []  Yes [x]  No    Telehealth   []  Yes []  No    Total          Therapy Diagnosis/Practice Pattern:I       Number of Comorbidities:  [x]0     []1-2    []3+    Latex Allergy:  [x]NO      []YES  Preferred Language for Healthcare:   [x]English       []other:    SUBJECTIVE:   4 weeks post op:    Pt was sore yesterday after he took a walk and worked on computer. Pt is not sleeping well.   Pt is taking ibuprofen    OBJECTIVE: See eval   Observation:   Test measurements:       ROM Right current   Shoulder Flex 60 133   Shoulder Abd      Shoulder ER 10 55   Shoulder IR      Elbow flex 135    Elbow ext Lacking 10           Strength  Right    Shoulder Flex n/a    Shoulder Scap n/a    Shoulder ER n/a    Shoulder IR n/a           Pain scale 2-7 / 10    Quick Dash 45 = 77%        RESTRICTIONS/PRECAUTIONS: biceps tenodesis. Pt said Dr is weaning him from sling. Pt understood this still means NO lifting at all with right UE. Exercises/Interventions:     Therapeutic Ex (90186) Sets/sec Reps Notes/CUES         SBS :05 X 10 hep   Shrug :05 X 10 hep   Passive elbow flex  X 10 hep   pendulum  X 10 hep         Cane press/   Cane flex X 10  X 10    pulleys  X 20     Table slides  Flex /  ER X 10 X 10    SB ball rolls  X 20           Bent over triceps ext 0# 2 X 15     Bent over row 0# 2 X 15          Shoulder Iso ER/ IR/  ABD   :05 X 10     Manual Intervention (01017)      PROM  Flex, abd, ER  8 min In painfree range   SL scap mobs  3 min                            NMR re-education (17409)   CUES NEEDED                                       Therapeutic Activity (28375)                          Therapeutic Exercise and NMR EXR  [x] (38289) Provided verbal/tactile cueing for activities related to strengthening, flexibility, endurance, ROM  for improvements in scapular, scapulothoracic and UE control with self care, reaching, carrying, lifting, house/yardwork, driving/computer work.    [] (42233) Provided verbal/tactile cueing for activities related to improving balance, coordination, kinesthetic sense, posture, motor skill, proprioception  to assist with  scapular, scapulothoracic and UE control with self care, reaching, carrying, lifting, house/yardwork, driving/computer work. Therapeutic Activities:    [] (94071 or 77478) Provided verbal/tactile cueing for activities related to improving balance, coordination, kinesthetic sense, posture, motor skill, proprioception and motor activation to allow for proper function of scapular, scapulothoracic and UE control with self care, carrying, lifting, driving/computer work.      Home Exercise Program:    [x] (20073) Reviewed/Progressed HEP activities related to strengthening, flexibility, endurance, ROM of scapular, scapulothoracic and UE control with self care, reaching, carrying, lifting, house/yardwork, driving/computer work  [] (47084) Reviewed/Progressed HEP activities related to improving balance, coordination, kinesthetic sense, posture, motor skill, proprioception of scapular, scapulothoracic and UE control with self care, reaching, carrying, lifting, house/yardwork, driving/computer work      Manual Treatments:  PROM / STM / Oscillations-Mobs:  G-I, II, III, IV (PA's, Inf., Post.)  [x] (62501) Provided manual therapy to mobilize soft tissue/joints of cervical/CT, scapular GHJ and UE for the purpose of modulating pain, promoting relaxation,  increasing ROM, reducing/eliminating soft tissue swelling/inflammation/restriction, improving soft tissue extensibility and allowing for proper ROM for normal function with self care, reaching, carrying, lifting, house/yardwork, driving/computer work    Modalities:      Charges:  Timed Code Treatment Minutes: 30   Total Treatment Minutes: 45       [] EVAL (LOW) 29127   [] EVAL (MOD) 70401   [] EVAL (HIGH) 66057   [] RE-EVAL     [x] HS(86513) x   1  [] IONTO  [] NMR (60490) x     [] VASO  [x] Manual (68937) x 1     [x] Other:ice  [] TA x      [] Mech Traction (66502)  [] ES(attended) (65609)      [] ES (un) (63337):     GOALS:  Patient stated goal: abolish shoulder pain. Return to work. [] Progressing: [] Met: [] Not Met: [] Adjusted    Therapist goals for Patient:   Short Term Goals: To be achieved in: 2 weeks  1. Independent in HEP and progression per patient tolerance, in order to prevent re-injury. [] Progressing: [] Met: [] Not Met: [] Adjusted  2. Patient will have a decrease in pain to facilitate improvement in movement, function, and ADLs as indicated by Functional Deficits. [] Progressing: [] Met: [] Not Met: [] Adjusted    Long Term Goals: To be achieved in: 12 weeks  1. Disability index score of 25% or less for the [facility] to assist with reaching prior level of function. [] Progressing: [] Met: [] Not Met: [] Adjusted  2. Patient will demonstrate increased AROM to 150 flex, 90 ER and 70 IR to allow for proper joint functioning as indicated by patients Functional Deficits. [] Progressing: [] Met: [] Not Met: [] Adjusted  3. Patient will demonstrate an increase in Strength to 4+/ 5 to allow for proper functional mobility as indicated by patients Functional Deficits. [] Progressing: [] Met: [] Not Met: [] Adjusted  4. Patient will return to sleeping for 7-8 hours without increased symptoms or restriction. [] Progressing: [] Met: [] Not Met: [] Adjusted  5. Able dress, drive, and complete light household work without increased symptoms or restrictions. Progression Towards Functional goals:  [x] Patient is progressing as expected towards functional goals listed. [] Progression is slowed due to complexities listed. [] Progression has been slowed due to co-morbidities. [] Plan just implemented, too soon to assess goals progression  [] Other:     ASSESSMENT: Pt is improving with ROM. Pt has two more weeks before resisted exercises can begin    Overall Progression Towards Functional goals/ Treatment Progress Update:  [x] Patient is progressing as expected towards functional goals listed. [] Progression is slowed due to complexities/Impairments listed. [] Progression has been slowed due to co-morbidities.   [] Plan just implemented, too soon to assess goals progression <30days   [] Goals require adjustment due to lack of progress  [] Patient is not progressing as expected and requires additional follow up with physician  [] Other    Prognosis for POC: [x] Good [] Fair  [] Poor      Patient requires continued skilled intervention: [x] Yes  [] No    Treatment/Activity Tolerance:  [x] Patient able to complete treatment  [] Patient limited by fatigue  [] Patient limited by pain    [] Patient limited by other medical complications  [] Other:          PLAN: See eval  [x] Continue per plan of care [] Alter current plan (see comments above)  [] Plan of care initiated [] Hold pending MD visit [] Discharge      Electronically signed by:  Marge Rowe PT    Note: If patient does not return for scheduled/ recommended follow up visits, this note will serve as a discharge from care along with most recent update on progress.

## 2021-01-19 ENCOUNTER — HOSPITAL ENCOUNTER (OUTPATIENT)
Dept: PHYSICAL THERAPY | Age: 56
Setting detail: THERAPIES SERIES
Discharge: HOME | End: 2021-01-19
Payer: COMMERCIAL

## 2021-01-19 PROCEDURE — 97140 MANUAL THERAPY 1/> REGIONS: CPT | Performed by: PHYSICAL THERAPIST

## 2021-01-19 PROCEDURE — 97110 THERAPEUTIC EXERCISES: CPT | Performed by: PHYSICAL THERAPIST

## 2021-01-19 NOTE — FLOWSHEET NOTE
[facility],  [address] [place]  Phone: [phone]  Fax [phone]        Date:  2021    Patient Name:  Lesly Brandon    :  1965  MRN: [id number]  Restrictions/Precautions:    Medical/Treatment Diagnosis Information:  · Diagnosis: right shoulder pain  M25.511    s/p SAD with open biceps tenodesis   DOS:  12/15/20  · Treatment Diagnosis: right shoulder pain  V76.883  Insurance/Certification information:  PT Insurance Information: [facility] -$1500 deductible. 40 PT allowed, no auth. Physician Information:  Referring Practitioner: Dr. Eyad Bell  Has the plan of care been signed (Y/N):        []  Yes  [x]  No     Date of Patient follow up with Physician: 20      Is this a Progress Report:     []  Yes  [x]  No        If Yes:  Date Range for reporting period:  Beginnin20  Ending:     Progress report will be due (10 Rx or 30 days whichever is less):        Recertification will be due (POC Duration  / 90 days whichever is less):       Visit # Insurance Allowable Auth Required   In-person 5 40 []  Yes [x]  No    Telehealth   []  Yes []  No    Total          Therapy Diagnosis/Practice Pattern:I       Number of Comorbidities:  [x]0     []1-2    []3+    Latex Allergy:  [x]NO      []YES  Preferred Language for Healthcare:   [x]English       []other:    SUBJECTIVE:   5 weeks post op:    Pt is unable to sleep through the night. OBJECTIVE: See eval   Observation:   Test measurements:       ROM Right current   Shoulder Flex 60 148   Shoulder Abd      Shoulder ER 10 90   Shoulder IR   56   Elbow flex 135    Elbow ext Lacking 10           Strength  Right    Shoulder Flex n/a    Shoulder Scap n/a    Shoulder ER n/a    Shoulder IR n/a           Pain scale 2-7 / 10 2   Quick Dash 45 = 77%        RESTRICTIONS/PRECAUTIONS: biceps tenodesis. Pt said  is weaning him from sling.   Pt understood this still means NO lifting at all with right UE. Exercises/Interventions:     Therapeutic Ex (97307) Sets/sec Reps Notes/CUES         SBS/ Shrug X 10  X 10 hep         Cane press/   Cane flex  X 10    Serratus punch  X 20     Supine R/s  2 directions  X 20     SL ERC to neutral  3 x 10     Prone row 4 step  X 20     pulleys  X 20        SB ball rolls  X 20                       Shoulder Iso ER/ IR/  ABD   :05 X 10     Manual Intervention (08398)      PROM    8 min In painfree range                              NMR re-education (94312)   CUES NEEDED                                       Therapeutic Activity (02786)                          Therapeutic Exercise and NMR EXR  [x] (59882) Provided verbal/tactile cueing for activities related to strengthening, flexibility, endurance, ROM  for improvements in scapular, scapulothoracic and UE control with self care, reaching, carrying, lifting, house/yardwork, driving/computer work.    [] (84732) Provided verbal/tactile cueing for activities related to improving balance, coordination, kinesthetic sense, posture, motor skill, proprioception  to assist with  scapular, scapulothoracic and UE control with self care, reaching, carrying, lifting, house/yardwork, driving/computer work. Therapeutic Activities:    [] (62139 or 24668) Provided verbal/tactile cueing for activities related to improving balance, coordination, kinesthetic sense, posture, motor skill, proprioception and motor activation to allow for proper function of scapular, scapulothoracic and UE control with self care, carrying, lifting, driving/computer work.      Home Exercise Program:    [x] (81341) Reviewed/Progressed HEP activities related to strengthening, flexibility, endurance, ROM of scapular, scapulothoracic and UE control with self care, reaching, carrying, lifting, house/yardwork, driving/computer work  [] (46042) Reviewed/Progressed HEP activities related to improving balance, coordination, kinesthetic sense, posture, motor skill, proprioception of scapular, scapulothoracic and UE control with self care, reaching, carrying, lifting, house/yardwork, driving/computer work      Manual Treatments:  PROM / STM / Oscillations-Mobs:  G-I, II, III, IV (PA's, Inf., Post.)  [x] (12717) Provided manual therapy to mobilize soft tissue/joints of cervical/CT, scapular GHJ and UE for the purpose of modulating pain, promoting relaxation,  increasing ROM, reducing/eliminating soft tissue swelling/inflammation/restriction, improving soft tissue extensibility and allowing for proper ROM for normal function with self care, reaching, carrying, lifting, house/yardwork, driving/computer work    Modalities:      Charges:  Timed Code Treatment Minutes: 30   Total Treatment Minutes: 45       [] EVAL (LOW) 05045   [] EVAL (MOD) 72169   [] EVAL (HIGH) 43606   [] RE-EVAL     [x] HG(99414) x   1  [] IONTO  [] NMR (67560) x     [] VASO  [x] Manual (52023) x 1     [x] Other:ice  [] TA x      [] Mech Traction (05829)  [] ES(attended) (65434)      [] ES (un) (56085):     GOALS:  Patient stated goal: abolish shoulder pain. Return to work. [] Progressing: [] Met: [] Not Met: [] Adjusted    Therapist goals for Patient:   Short Term Goals: To be achieved in: 2 weeks  1. Independent in HEP and progression per patient tolerance, in order to prevent re-injury. [] Progressing: [] Met: [] Not Met: [] Adjusted  2. Patient will have a decrease in pain to facilitate improvement in movement, function, and ADLs as indicated by Functional Deficits. [] Progressing: [] Met: [] Not Met: [] Adjusted    Long Term Goals: To be achieved in: 12 weeks  1. Disability index score of 25% or less for the [facility] to assist with reaching prior level of function. [] Progressing: [] Met: [] Not Met: [] Adjusted  2. Patient will demonstrate increased AROM to 150 flex, 90 ER and 70 IR to allow for proper joint functioning as indicated by patients Functional Deficits. [] Progressing: [] Met: [] Not Met: [] Adjusted  3. Patient will demonstrate an increase in Strength to 4+/ 5 to allow for proper functional mobility as indicated by patients Functional Deficits. [] Progressing: [] Met: [] Not Met: [] Adjusted  4. Patient will return to sleeping for 7-8 hours without increased symptoms or restriction. [] Progressing: [] Met: [] Not Met: [] Adjusted  5. Able dress, drive, and complete light household work without increased symptoms or restrictions. Progression Towards Functional goals:  [x] Patient is progressing as expected towards functional goals listed. [] Progression is slowed due to complexities listed. [] Progression has been slowed due to co-morbidities. [] Plan just implemented, too soon to assess goals progression  [] Other:     ASSESSMENT: Pt is improving with ROM. Pt has two more weeks before resisted exercises can begin    Overall Progression Towards Functional goals/ Treatment Progress Update:  [x] Patient is progressing as expected towards functional goals listed. [] Progression is slowed due to complexities/Impairments listed. [] Progression has been slowed due to co-morbidities.   [] Plan just implemented, too soon to assess goals progression <30days   [] Goals require adjustment due to lack of progress  [] Patient is not progressing as expected and requires additional follow up with physician  [] Other    Prognosis for POC: [x] Good [] Fair  [] Poor      Patient requires continued skilled intervention: [x] Yes  [] No    Treatment/Activity Tolerance:  [x] Patient able to complete treatment  [] Patient limited by fatigue  [] Patient limited by pain    [] Patient limited by other medical complications  [] Other:          PLAN: See eval  [x] Continue per plan of care [] Alter current plan (see comments above)  [] Plan of care initiated [] Hold pending MD visit [] Discharge      Electronically signed by:  Tere Singh, PT    Note: If patient does not return for scheduled/ recommended follow up visits, this note will serve as a discharge from care along with most recent update on progress.

## 2021-01-26 ENCOUNTER — TELEPHONE (OUTPATIENT)
Dept: ORTHOPEDIC SURGERY | Age: 56
End: 2021-01-26

## 2021-01-26 ENCOUNTER — HOSPITAL ENCOUNTER (OUTPATIENT)
Dept: PHYSICAL THERAPY | Age: 56
Setting detail: THERAPIES SERIES
Discharge: HOME | End: 2021-01-26
Payer: COMMERCIAL

## 2021-01-26 PROCEDURE — 97110 THERAPEUTIC EXERCISES: CPT | Performed by: PHYSICAL THERAPIST

## 2021-01-26 PROCEDURE — 97140 MANUAL THERAPY 1/> REGIONS: CPT | Performed by: PHYSICAL THERAPIST

## 2021-01-26 RX ORDER — SULFAMETHOXAZOLE AND TRIMETHOPRIM 800; 160 MG/1; MG/1
1 TABLET ORAL 2 TIMES DAILY
Qty: 14 TABLET | Refills: 0 | Status: SHIPPED | OUTPATIENT
Start: 2021-01-26 | End: 2021-02-02

## 2021-01-26 NOTE — FLOWSHEET NOTE
[facility], [address]  Phone: [phone]  Fax [phone]        Date:  2021    Patient Name:  Lupis Hughes    :  1965  MRN: [id number]  Restrictions/Precautions:    Medical/Treatment Diagnosis Information:  · Diagnosis: right shoulder pain  M25.511    s/p SAD with open biceps tenodesis   DOS:  12/15/20  · Treatment Diagnosis: right shoulder pain  Y26.846  Insurance/Certification information:  PT Insurance Information: [facility] -$1500 deductible. 40 PT allowed, no auth. Physician Information:  Referring Practitioner: Dr. Florencio Limon  Has the plan of care been signed (Y/N):        []  Yes  [x]  No     Date of Patient follow up with Physician: 20      Is this a Progress Report:     []  Yes  [x]  No        If Yes:  Date Range for reporting period:  Beginnin20  Ending:     Progress report will be due (10 Rx or 30 days whichever is less):        Recertification will be due (POC Duration  / 90 days whichever is less):       Visit # Insurance Allowable Auth Required   In-person 6 40 []  Yes [x]  No    Telehealth   []  Yes []  No    Total          Therapy Diagnosis/Practice Pattern:I       Number of Comorbidities:  [x]0     []1-2    []3+    Latex Allergy:  [x]NO      []YES  Preferred Language for Healthcare:   [x]English       []other:    SUBJECTIVE:   6 weeks post op:    Pt is unable to sleep through the night. Pt said on Wednesday that his incision site started draining. Pt did not call, and he self treated. It is dry now. He states that his incision site is not painful. He denies fever or shaky chills. Pt is currently taking ibuprofen twice a day. Pt reports no difficulty with HEP. SHAAN Neri examined shoulder today. Told pt to keep it covered, and she is prescribing antibiotics. Pt has apptment with Doc on Thursday.       OBJECTIVE: See eval   Observation: Test measurements:       ROM Right current   Shoulder Flex 60 148   Shoulder Abd      Shoulder ER 10 90   Shoulder IR   62   Elbow flex 135    Elbow ext Lacking 10           Strength  Right    Shoulder Flex n/a    Shoulder Scap n/a    Shoulder ER n/a    Shoulder IR n/a           Pain scale 2-7 / 10 2   Quick Dash 45 = 77% 35  = 55%       RESTRICTIONS/PRECAUTIONS: biceps tenodesis. Pt said Dr is weaning him from sling. Pt understood this still means NO lifting at all with right UE. Exercises/Interventions:     Therapeutic Ex (08423) Sets/sec Reps Notes/CUES               Cane press/   Cane flex      Serratus punch  X 20     Supine R/s  2 directions  X 20     SL ERC to neutral  3 x 10     SLshoulder abd  2 x 10     Prone row 4 step  X 20     pulleys  X 20        SB ball rolls  X 20     TB row/  TB  ext GR x 20 GR x 20     Wall walks  X 10     TB IR/ ER YL  X 20              Manual Intervention (12611)      PROM    8 min In painfree range                              NMR re-education (89716)   CUES NEEDED                                       Therapeutic Activity (39194)                          Therapeutic Exercise and NMR EXR  [x] (84423) Provided verbal/tactile cueing for activities related to strengthening, flexibility, endurance, ROM  for improvements in scapular, scapulothoracic and UE control with self care, reaching, carrying, lifting, house/yardwork, driving/computer work.    [] (53249) Provided verbal/tactile cueing for activities related to improving balance, coordination, kinesthetic sense, posture, motor skill, proprioception  to assist with  scapular, scapulothoracic and UE control with self care, reaching, carrying, lifting, house/yardwork, driving/computer work.     Therapeutic Activities:    [] (34076 or 16513) Provided verbal/tactile cueing for activities related to improving balance, coordination, kinesthetic sense, posture, motor skill, proprioception and motor activation to allow for proper function of scapular, scapulothoracic and UE control with self care, carrying, lifting, driving/computer work. Home Exercise Program:    [x] (05337) Reviewed/Progressed HEP activities related to strengthening, flexibility, endurance, ROM of scapular, scapulothoracic and UE control with self care, reaching, carrying, lifting, house/yardwork, driving/computer work  [] (75622) Reviewed/Progressed HEP activities related to improving balance, coordination, kinesthetic sense, posture, motor skill, proprioception of scapular, scapulothoracic and UE control with self care, reaching, carrying, lifting, house/yardwork, driving/computer work      Manual Treatments:  PROM / STM / Oscillations-Mobs:  G-I, II, III, IV (PA's, Inf., Post.)  [x] (91375) Provided manual therapy to mobilize soft tissue/joints of cervical/CT, scapular GHJ and UE for the purpose of modulating pain, promoting relaxation,  increasing ROM, reducing/eliminating soft tissue swelling/inflammation/restriction, improving soft tissue extensibility and allowing for proper ROM for normal function with self care, reaching, carrying, lifting, house/yardwork, driving/computer work    Modalities:      Charges:  Timed Code Treatment Minutes: 30   Total Treatment Minutes: 45       [] EVAL (LOW) 18352   [] EVAL (MOD) 22362   [] EVAL (HIGH) 93799   [] RE-EVAL     [x] LB(38025) x   1  [] IONTO  [] NMR (03184) x     [] VASO  [x] Manual (31147) x 1     [x] Other:ice  [] TA x      [] Mech Traction (10813)  [] ES(attended) (16237)      [] ES (un) (09374):     GOALS:  Patient stated goal: abolish shoulder pain. Return to work. [] Progressing: [] Met: [] Not Met: [] Adjusted    Therapist goals for Patient:   Short Term Goals: To be achieved in: 2 weeks  1. Independent in HEP and progression per patient tolerance, in order to prevent re-injury. [] Progressing: [] Met: [] Not Met: [] Adjusted  2.  Patient will have a decrease in pain to facilitate improvement in movement, function, and ADLs as indicated by Functional Deficits. [] Progressing: [] Met: [] Not Met: [] Adjusted    Long Term Goals: To be achieved in: 12 weeks  1. Disability index score of 25% or less for the [facility] to assist with reaching prior level of function. [] Progressing: [] Met: [] Not Met: [] Adjusted  2. Patient will demonstrate increased AROM to 150 flex, 90 ER and 70 IR to allow for proper joint functioning as indicated by patients Functional Deficits. [] Progressing: [] Met: [] Not Met: [] Adjusted  3. Patient will demonstrate an increase in Strength to 4+/ 5 to allow for proper functional mobility as indicated by patients Functional Deficits. [] Progressing: [] Met: [] Not Met: [] Adjusted  4. Patient will return to sleeping for 7-8 hours without increased symptoms or restriction. [] Progressing: [] Met: [] Not Met: [] Adjusted  5. Able dress, drive, and complete light household work without increased symptoms or restrictions. Progression Towards Functional goals:  [x] Patient is progressing as expected towards functional goals listed. [] Progression is slowed due to complexities listed. [] Progression has been slowed due to co-morbidities. [] Plan just implemented, too soon to assess goals progression  [] Other:     ASSESSMENT: Pt had incisions site checked by PA. Pt instructed to keep it covered. Pt has gauze, and he is to  antibiotic. Overall Progression Towards Functional goals/ Treatment Progress Update:  [x] Patient is progressing as expected towards functional goals listed. [] Progression is slowed due to complexities/Impairments listed. [] Progression has been slowed due to co-morbidities.   [] Plan just implemented, too soon to assess goals progression <30days   [] Goals require adjustment due to lack of progress  [] Patient is not progressing as expected and requires additional follow up with physician  [] Other    Prognosis for POC: [x] Good [] Fair  [] Poor      Patient requires continued skilled intervention: [x] Yes  [] No    Treatment/Activity Tolerance:  [x] Patient able to complete treatment  [] Patient limited by fatigue  [] Patient limited by pain    [] Patient limited by other medical complications  [] Other:          PLAN: See eval  [x] Continue per plan of care [] Alter current plan (see comments above)  [] Plan of care initiated [] Hold pending MD visit [] Discharge      Electronically signed by:  Cheri Harada, PT    Note: If patient does not return for scheduled/ recommended follow up visits, this note will serve as a discharge from care along with most recent update on progress.

## 2021-01-26 NOTE — TELEPHONE ENCOUNTER
Patient was in physical therapy today and reported drainage from his axillary incision. He is approximately 6 weeks status post rotator cuff repair and open biceps tenodesis by Dr. Lex Titus. Drainage began Friday, January 22, 2021. He reports it was a thin bloody drainage. Patient reports he has been keeping it clean and trying to keep it dry. Drainage has slowed down. On examination there is minimal erythema around the incision. The superior aspect of the incision shows a small less than 1 cm opening with no active drainage today. There are 2 small suture sites further inferior along the incision that appears to have not healed yet either. No purulence noted. Patient was instructed to keep his appointment with Dr. Lex Titus on Thursday, January 28, 2021. In the meantime he was instructed to keep the wound clean and dry. Bactrim DS was prescribed to twice daily for 7 days.     Blanca Wilkinson PA-C

## 2021-01-28 ENCOUNTER — OFFICE VISIT (OUTPATIENT)
Dept: ORTHOPEDIC SURGERY | Age: 56
End: 2021-01-28

## 2021-01-28 VITALS — HEIGHT: 70 IN | WEIGHT: 220 LBS | BODY MASS INDEX: 31.5 KG/M2

## 2021-01-28 DIAGNOSIS — M25.511 RIGHT SHOULDER PAIN, UNSPECIFIED CHRONICITY: Primary | ICD-10-CM

## 2021-01-28 PROCEDURE — 99024 POSTOP FOLLOW-UP VISIT: CPT | Performed by: ORTHOPAEDIC SURGERY

## 2021-01-28 RX ORDER — AMOXICILLIN 500 MG/1
500 CAPSULE ORAL 2 TIMES DAILY
Qty: 60 CAPSULE | Refills: 0 | Status: SHIPPED | OUTPATIENT
Start: 2021-01-28 | End: 2021-02-27

## 2021-01-28 NOTE — LETTER
[address] [place] [id number]  Phone: [phone]  Fax: [phone]    NNJBXS HDDGCJ MD OUSMANE        January 28, 2021     Patient: Fabian Stephenson   YOB: 1965   Date of Visit: 1/28/2021       To Whom It May Concern: It is my medical opinion that Markie Bob should remain out of work until 3/8/2021. If you have any questions or concerns, please don't hesitate to call.     Sincerely,        Joe Lea MD

## 2021-01-28 NOTE — PROGRESS NOTES
MD Rebecca Gregory, [place]         Orthopaedic Surgery and Sports Medicine    Patient Name: Love Severe  YOB: 1965  Patient's PCP is Alo ABDALLA      SUBJECTIVE  Chief Complaint  Post op right shoulder arthroscopy with biceps tenodesis, distal clavicle resection, subacromial decompression, and labral debridement  Date of Surgery: 12/15/2020    History of Present Illness:  Love Severe is a 48 y.o. male    Doing pretty well and was attending outpatient physical therapy. And then several days ago noticed that his axillary incision for his open biceps tenodesis had been pulled open. Was seen by one of our physician assistants and placed on Bactrim. He has been doing fine but he feels that he is reacting to the Bactrim giving him bad headaches just not making him feel well. He has never had Bactrim before he does not believe. Shoulder has been reasonably sore. Especially with overhead activities. The patient's pain is rated at 4/10. The patient denies fever, wound drainage, increasing redness, pus, increasing swelling. Pain Assessment:  Pain Assessment  Location of Pain: Shoulder  Location Modifiers: Right  Severity of Pain: 3  Quality of Pain: Aching  Duration of Pain: Persistent  Frequency of Pain: Constant  Aggravating Factors: Bending, Stretching, Straightening, Other (Comment)  Limiting Behavior: Yes  Relieving Factors: Rest  Result of Injury: No  Work-Related Injury: No  Are there other pain locations you wish to document?: No        OBJECTIVE  PHYSICAL EXAM  Vital Signs: There were no vitals filed for this visit. Body mass index is 31.57 kg/m².   General Appearance: Patient is adequately groomed with no evidence of malnutrition   Orientation: Patient is alert and oriented to person, place and time  Mood and Affect: Neutral/Euthymic(normal)     Right Shoulder Examination: His portal sites are well-healed. Inspecting his axilla he does have a couple areas where the skin appears it is just been pulled apart in the incision line. There is minimal if any drainage there is no purulent drainage there is no surrounding erythema. There is no palpable masses. Cannot palpate an abscess. There is no warmth, erythema, or purulent drainage over the incision. Skin is intact. Minimal ecchymosis. Distal circulatory status and neurologic status is intact. Sensation is intact to light touch in the axillary, median, radial, and ulnar nerve distribution. The EPL, FPL, and interossei are grossly intact. The right upper extremity is warm and well-perfused with brisk capillary refill. Patient tolerated gentle passive range of motion. ASSESSMENT (Medical Decision Making)    Joann Woodson is a 48 y.o. male progressing well from right shoulder arthroscopy with open biceps tenodesis, subacromial decompression, labral debridement, and a distal clavicle resection. . Now with dehiscence of his incision and a couple areas which are relatively small but still open. PLAN (Medical Decision Making)  Office Procedures:  No orders of the defined types were placed in this encounter. Treatment Plan:  Sling is no longer necessary. He is being placed on amoxicillin 500 mg 1 twice a day. We will discontinue his Bactrim. We are hoping that he could follow-up in 2 to 3 weeks. He is getting be out of town around that time so we probably will not see him unless we would see him before that. We only need to see him before that if he would get worse. Specific precautions were reviewed and emphasized. Patient will continue outpatient physical therapy. Address his range of motion. Everett Wright Non-steroidal anti-inflammatories medications (NSAIDs) can be used to assist with pain control and to reduce inflammatory changes. These medications may be over-the-counter or prescribed. We discussed taking the NSAID properly and the precautions. The patient understands that this medication may potentially interfere with other medications. Patient was also instructed to immediately discontinue the medication is there is any possible complication. Amy Easley was instructed to call the office if his symptoms worsen or if new symptoms appear prior to the next scheduled visit. He is specifically instructed to contact the office between now and schedule appointment if he has concerns related to his condition or if he needs assistance in scheduling any above tests. He is welcome to call for an appointment sooner if he has any additional concerns or questions. This dictation was performed with a verbal recognition program (DRAGON) and it was checked for errors. It is possible that there are still dictated errors within this office note. If so, please bring any errors to my attention for an addendum. All efforts were made to ensure that this office note is accurate.

## 2021-02-02 ENCOUNTER — APPOINTMENT (OUTPATIENT)
Dept: PHYSICAL THERAPY | Age: 56
End: 2021-02-02
Payer: COMMERCIAL

## 2021-02-09 ENCOUNTER — APPOINTMENT (OUTPATIENT)
Dept: PHYSICAL THERAPY | Age: 56
End: 2021-02-09
Payer: COMMERCIAL

## 2021-02-10 ENCOUNTER — HOSPITAL ENCOUNTER (OUTPATIENT)
Dept: PHYSICAL THERAPY | Age: 56
Setting detail: THERAPIES SERIES
Discharge: HOME | End: 2021-02-10
Payer: COMMERCIAL

## 2021-02-10 PROCEDURE — 97110 THERAPEUTIC EXERCISES: CPT | Performed by: PHYSICAL THERAPIST

## 2021-02-10 PROCEDURE — 97140 MANUAL THERAPY 1/> REGIONS: CPT | Performed by: PHYSICAL THERAPIST

## 2021-02-10 NOTE — FLOWSHEET NOTE
biceps tenodesis. Pt said Dr is weaning him from sling. Pt understood this still means NO lifting at all with right UE. Exercises/Interventions:     Therapeutic Ex (48600) Sets/sec Reps Notes/CUES         Supine pec s :20 3x    Supine angels  X 10     Cane press/   Cane flex                  Prone row 4 step  X 20     pulleys  X 20        SB ball rolls  X 20     TB row/  TB  ext GR x 20 GR x 20     Wall walks  X 10     TB IR/ ER walkouts :05 RD x 10     Biceps s doorway :20 3x        Manual Intervention (70315)      PROM    6 min In painfree range      Biceps STM  5 min                      NMR re-education (40882)   CUES NEEDED                                       Therapeutic Activity (07242)                          Therapeutic Exercise and NMR EXR  [x] (41467) Provided verbal/tactile cueing for activities related to strengthening, flexibility, endurance, ROM  for improvements in scapular, scapulothoracic and UE control with self care, reaching, carrying, lifting, house/yardwork, driving/computer work.    [] (12946) Provided verbal/tactile cueing for activities related to improving balance, coordination, kinesthetic sense, posture, motor skill, proprioception  to assist with  scapular, scapulothoracic and UE control with self care, reaching, carrying, lifting, house/yardwork, driving/computer work. Therapeutic Activities:    [] (57784 or 24894) Provided verbal/tactile cueing for activities related to improving balance, coordination, kinesthetic sense, posture, motor skill, proprioception and motor activation to allow for proper function of scapular, scapulothoracic and UE control with self care, carrying, lifting, driving/computer work.      Home Exercise Program:    [x] (19900) Reviewed/Progressed HEP activities related to strengthening, flexibility, endurance, ROM of scapular, scapulothoracic and UE control with self care, reaching, carrying, lifting, house/yardwork, driving/computer work  [] (38723) Reviewed/Progressed HEP activities related to improving balance, coordination, kinesthetic sense, posture, motor skill, proprioception of scapular, scapulothoracic and UE control with self care, reaching, carrying, lifting, house/yardwork, driving/computer work      Manual Treatments:  PROM / STM / Oscillations-Mobs:  G-I, II, III, IV (PA's, Inf., Post.)  [x] (72685) Provided manual therapy to mobilize soft tissue/joints of cervical/CT, scapular GHJ and UE for the purpose of modulating pain, promoting relaxation,  increasing ROM, reducing/eliminating soft tissue swelling/inflammation/restriction, improving soft tissue extensibility and allowing for proper ROM for normal function with self care, reaching, carrying, lifting, house/yardwork, driving/computer work    Modalities:      Charges:  Timed Code Treatment Minutes: 40   Total Treatment Minutes: 55       [] EVAL (LOW) 21016   [] EVAL (MOD) 71549   [] EVAL (HIGH) 98509   [] RE-EVAL     [x] QC(17419) x   2  [] IONTO  [] NMR (16235) x     [] VASO  [x] Manual (06903) x 1     [x] Other:ice  [] TA x      [] Mech Traction (30444)  [] ES(attended) (06883)      [] ES (un) (93605):     GOALS:  Patient stated goal: abolish shoulder pain. Return to work. [] Progressing: [] Met: [] Not Met: [] Adjusted    Therapist goals for Patient:   Short Term Goals: To be achieved in: 2 weeks  1. Independent in HEP and progression per patient tolerance, in order to prevent re-injury. [] Progressing: [] Met: [] Not Met: [] Adjusted  2. Patient will have a decrease in pain to facilitate improvement in movement, function, and ADLs as indicated by Functional Deficits. [] Progressing: [] Met: [] Not Met: [] Adjusted    Long Term Goals: To be achieved in: 12 weeks  1. Disability index score of 25% or less for the [facility] to assist with reaching prior level of function. [] Progressing: [] Met: [] Not Met: [] Adjusted  2.  Patient will demonstrate increased AROM to 150 flex, 90 ER and of care initiated [] Hold pending MD visit [] Discharge      Electronically signed by:  Vilma Ayala PT    Note: If patient does not return for scheduled/ recommended follow up visits, this note will serve as a discharge from care along with most recent update on progress.

## 2021-02-19 ENCOUNTER — APPOINTMENT (OUTPATIENT)
Dept: PHYSICAL THERAPY | Age: 56
End: 2021-02-19
Payer: COMMERCIAL

## 2021-02-22 ENCOUNTER — HOSPITAL ENCOUNTER (OUTPATIENT)
Dept: PHYSICAL THERAPY | Age: 56
Setting detail: THERAPIES SERIES
Discharge: HOME | End: 2021-02-22
Payer: COMMERCIAL

## 2021-02-22 PROCEDURE — 97110 THERAPEUTIC EXERCISES: CPT | Performed by: PHYSICAL THERAPIST

## 2021-02-22 PROCEDURE — 97140 MANUAL THERAPY 1/> REGIONS: CPT | Performed by: PHYSICAL THERAPIST

## 2021-02-22 NOTE — FLOWSHEET NOTE
[facility],  [address] [place]  Phone: [phone]  Fax [phone]        Date:  2021    Patient Name:  Love Severe    :  1965  MRN: [id number]  Restrictions/Precautions:    Medical/Treatment Diagnosis Information:  · Diagnosis: right shoulder pain  M25.511    s/p SAD with open biceps tenodesis   DOS:  12/15/20  · Treatment Diagnosis: right shoulder pain  J71.259  Insurance/Certification information:  PT Insurance Information: [facility] -$1500 deductible. 40 PT allowed, no auth. Physician Information:  Referring Practitioner: Dr. Guerrero Schooling  Has the plan of care been signed (Y/N):        []  Yes  [x]  No     Date of Patient follow up with Physician: 20      Is this a Progress Report:     []  Yes  [x]  No        If Yes:  Date Range for reporting period:  Beginnin20  Ending:     Progress report will be due (10 Rx or 30 days whichever is less):        Recertification will be due (POC Duration  / 90 days whichever is less):       Visit # Insurance Allowable Auth Required   In-person 8 40 []  Yes [x]  No    Telehealth   []  Yes []  No    Total          Therapy Diagnosis/Practice Pattern:I       Number of Comorbidities:  [x]0     []1-2    []3+    Latex Allergy:  [x]NO      []YES  Preferred Language for Healthcare:   [x]English       []other:    SUBJECTIVE:   Pt is 10 weeks post op. Pt is off work until .   Pt has been in [facility]. He drove to and from. His shoulder was sore by end the drive. Incision site is healing. Pt saw Dr. Jai Reed wants more motion and strength for RTW. Doc set 10 lbs limit.       OBJECTIVE: See eval   Observation:        ROM Right current   Shoulder Flex 60 148   Shoulder Abd      Shoulder ER 10 90   Shoulder IR   62   Elbow flex 135    Elbow ext Lacking 10           Strength Pt d Right    Shoulder Flex n/a    Shoulder Scap n/a    Shoulder ER n/a    Shoulder IR n/a           Pain scale 2-7 / 10 2   Quick Dash 45 = 77% 35  = 55%       RESTRICTIONS/PRECAUTIONS: biceps tenodesis. Pt said Dr is weaning him from sling. Pt understood this still means NO lifting at all with right UE. Exercises/Interventions:     Therapeutic Ex (53967) Sets/sec Reps Notes/CUES         Supine pec s :20 3x    Supine angels  X 10     Cane flex  X 10    Serratus punch 7# X 30     Supine R/s  4 directions 7# X 20     Supine punch 7# X 20     Sleeper s : 20  3x     SL ERC to neutral 3# 3 x 10     SLshoulder abd 3# 2 x 10     Prone row 4 step 3# X 20     pulleys  X 20           TB row/  TB  ext GR x 20 GR x 20     Wall walks  X 10     TB IR/ ER  RD  3 x 10     Biceps s doorway :20 3x        Manual Intervention (87726)      PROM    8 min In painfree range                     NMR re-education (59716)   CUES NEEDED                                       Therapeutic Activity (07919)                          Therapeutic Exercise and NMR EXR  [x] (71560) Provided verbal/tactile cueing for activities related to strengthening, flexibility, endurance, ROM  for improvements in scapular, scapulothoracic and UE control with self care, reaching, carrying, lifting, house/yardwork, driving/computer work.    [] (49253) Provided verbal/tactile cueing for activities related to improving balance, coordination, kinesthetic sense, posture, motor skill, proprioception  to assist with  scapular, scapulothoracic and UE control with self care, reaching, carrying, lifting, house/yardwork, driving/computer work. Therapeutic Activities:    [] (39207 or 00585) Provided verbal/tactile cueing for activities related to improving balance, coordination, kinesthetic sense, posture, motor skill, proprioception and motor activation to allow for proper function of scapular, scapulothoracic and UE control with self care, carrying, lifting, driving/computer work.      Home Exercise Program:    [x] (34738) Reviewed/Progressed HEP activities related to strengthening, flexibility, endurance, ROM of scapular, scapulothoracic and UE control with self care, reaching, carrying, lifting, house/yardwork, driving/computer work  [] (85982) Reviewed/Progressed HEP activities related to improving balance, coordination, kinesthetic sense, posture, motor skill, proprioception of scapular, scapulothoracic and UE control with self care, reaching, carrying, lifting, house/yardwork, driving/computer work      Manual Treatments:  PROM / STM / Oscillations-Mobs:  G-I, II, III, IV (PA's, Inf., Post.)  [x] (96034) Provided manual therapy to mobilize soft tissue/joints of cervical/CT, scapular GHJ and UE for the purpose of modulating pain, promoting relaxation,  increasing ROM, reducing/eliminating soft tissue swelling/inflammation/restriction, improving soft tissue extensibility and allowing for proper ROM for normal function with self care, reaching, carrying, lifting, house/yardwork, driving/computer work    Modalities:      Charges:  Timed Code Treatment Minutes: 45   Total Treatment Minutes: 55       [] EVAL (LOW) 94301   [] EVAL (MOD) 85271   [] EVAL (HIGH) 92252   [] RE-EVAL     [x] QQ(85913) x   2  [] IONTO  [] NMR (86484) x     [] VASO  [x] Manual (16631) x 1     [x] Other:ice  [] TA x      [] Mech Traction (73339)  [] ES(attended) (55907)      [] ES (un) (51749):     GOALS:  Patient stated goal: abolish shoulder pain. Return to work. [] Progressing: [] Met: [] Not Met: [] Adjusted    Therapist goals for Patient:   Short Term Goals: To be achieved in: 2 weeks  1. Independent in HEP and progression per patient tolerance, in order to prevent re-injury. [] Progressing: [] Met: [] Not Met: [] Adjusted  2. Patient will have a decrease in pain to facilitate improvement in movement, function, and ADLs as indicated by Functional Deficits. [] Progressing: [] Met: [] Not Met: [] Adjusted    Long Term Goals: To be achieved in: 12 weeks  1.  Disability index score of 25% or less for the [facility] to assist with reaching prior level of function. [] Progressing: [] Met: [] Not Met: [] Adjusted  2. Patient will demonstrate increased AROM to 150 flex, 90 ER and 70 IR to allow for proper joint functioning as indicated by patients Functional Deficits. [] Progressing: [] Met: [] Not Met: [] Adjusted  3. Patient will demonstrate an increase in Strength to 4+/ 5 to allow for proper functional mobility as indicated by patients Functional Deficits. [] Progressing: [] Met: [] Not Met: [] Adjusted  4. Patient will return to sleeping for 7-8 hours without increased symptoms or restriction. [] Progressing: [] Met: [] Not Met: [] Adjusted  5. Able dress, drive, and complete light household work without increased symptoms or restrictions. Progression Towards Functional goals:  [x] Patient is progressing as expected towards functional goals listed. [] Progression is slowed due to complexities listed. [] Progression has been slowed due to co-morbidities. [] Plan just implemented, too soon to assess goals progression  [] Other:     ASSESSMENT: Pt demonstrated the most weakness with post cuff and scapular exercises. Pt was fatigued by end of visit. Pt inquired about back exercises. Overall Progression Towards Functional goals/ Treatment Progress Update:  [x] Patient is progressing as expected towards functional goals listed. [] Progression is slowed due to complexities/Impairments listed. [] Progression has been slowed due to co-morbidities.   [] Plan just implemented, too soon to assess goals progression <30days   [] Goals require adjustment due to lack of progress  [] Patient is not progressing as expected and requires additional follow up with physician  [] Other    Prognosis for POC: [x] Good [] Fair  [] Poor      Patient requires continued skilled intervention: [x] Yes  [] No    Treatment/Activity Tolerance:  [x] Patient able to complete treatment  [] Patient limited by fatigue  [] Patient limited by pain    [] Patient limited by other medical complications  [] Other:          PLAN: See eval  [x] Continue per plan of care [] Alter current plan (see comments above)  [] Plan of care initiated [] Hold pending MD visit [] Discharge      Electronically signed by:  Kristen Pinedo PT    Note: If patient does not return for scheduled/ recommended follow up visits, this note will serve as a discharge from care along with most recent update on progress.

## 2021-03-01 ENCOUNTER — APPOINTMENT (OUTPATIENT)
Dept: PHYSICAL THERAPY | Age: 56
End: 2021-03-01
Payer: COMMERCIAL

## 2021-03-02 ENCOUNTER — HOSPITAL ENCOUNTER (OUTPATIENT)
Dept: PHYSICAL THERAPY | Age: 56
Setting detail: THERAPIES SERIES
Discharge: HOME | End: 2021-03-02
Payer: COMMERCIAL

## 2021-03-02 PROCEDURE — 97110 THERAPEUTIC EXERCISES: CPT | Performed by: PHYSICAL THERAPIST

## 2021-03-02 PROCEDURE — 97140 MANUAL THERAPY 1/> REGIONS: CPT | Performed by: PHYSICAL THERAPIST

## 2021-03-02 NOTE — FLOWSHEET NOTE
[facility], [address] [place]  Phone: [phone]  Fax [phone]        Date:  3/2/2021    Patient Name:  Lara Redmond    :  1965  MRN: [id number]  Restrictions/Precautions:    Medical/Treatment Diagnosis Information:  · Diagnosis: right shoulder pain  M25.511    s/p SAD with open biceps tenodesis   DOS:  12/15/20  · Treatment Diagnosis: right shoulder pain  O40.131  Insurance/Certification information:  PT Insurance Information: [facility] -$1500 deductible. 40 PT allowed, no auth. Physician Information:  Referring Practitioner: Dr. Cavanaugh Locus  Has the plan of care been signed (Y/N):        []  Yes  [x]  No     Date of Patient follow up with Physician: 20      Is this a Progress Report:     []  Yes  [x]  No        If Yes:  Date Range for reporting period:  Beginnin20  Ending:     Progress report will be due (10 Rx or 30 days whichever is less):        Recertification will be due (POC Duration  / 90 days whichever is less):       Visit # Insurance Allowable Auth Required   In-person 9 40 []  Yes [x]  No    Telehealth   []  Yes []  No    Total          Therapy Diagnosis/Practice Pattern:I       Number of Comorbidities:  [x]0     []1-2    []3+    Latex Allergy:  [x]NO      []YES  Preferred Language for Healthcare:   [x]English       []other:    SUBJECTIVE:   Pt is 11 weeks post op. Pt is fatigued from driving to and from [facility]. He is nervous about RTW on .    OBJECTIVE:    Observation:   Test measurements:       ROM Right current   Shoulder Flex 60 148   Shoulder Abd      Shoulder ER 10 90   Shoulder IR   62   Elbow flex 135    Elbow ext Lacking 10           Strength Pt d Right    Shoulder Flex n/a    Shoulder Scap n/a    Shoulder ER n/a    Shoulder IR n/a           Pain scale 2-7 / 10 2   Quick Dash 45 = 77% 35  = 55%       RESTRICTIONS/PRECAUTIONS: biceps tenodesis.    Pt said  is weaning him from sling. Pt understood this still means NO lifting at all with right UE. Exercises/Interventions:     Therapeutic Ex (00276) Sets/sec Reps Notes/CUES         Supine pec s :20 3x    Supine angels  X 10        Supine post cap s :20 3x     Serratus punch 8# X 30     Supine R/s  4 directions 8# X 20        Sleeper s : 20  3x     Supine hor abd/    Supine PNF GR x 20 GR x 15    SL ERC to neutral 4# 3 x 10        Prone row 4 step 3# X 20     Sleeper s :20 3x    pulleys  X 20     Ball on wall  nv          Wall push ups  X 20     Biceps curl 6#  x 30    IR s with towel :10 5x    TB row/  TB  ext BL x 20 BL x 20     Wall walks  X 10     TB IR/ ER  GR  3 x 10     Biceps s doorway :20 3x        Manual Intervention (18756)      PROM    8 min In painfree range                     NMR re-education (19243)   CUES NEEDED                                       Therapeutic Activity (36547)                          Therapeutic Exercise and NMR EXR  [x] (14350) Provided verbal/tactile cueing for activities related to strengthening, flexibility, endurance, ROM  for improvements in scapular, scapulothoracic and UE control with self care, reaching, carrying, lifting, house/yardwork, driving/computer work.    [] (94824) Provided verbal/tactile cueing for activities related to improving balance, coordination, kinesthetic sense, posture, motor skill, proprioception  to assist with  scapular, scapulothoracic and UE control with self care, reaching, carrying, lifting, house/yardwork, driving/computer work. Therapeutic Activities:    [] (89575 or 13190) Provided verbal/tactile cueing for activities related to improving balance, coordination, kinesthetic sense, posture, motor skill, proprioception and motor activation to allow for proper function of scapular, scapulothoracic and UE control with self care, carrying, lifting, driving/computer work.      Home Exercise Program:    [x] (59925) Reviewed/Progressed HEP score of 25% or less for the [facility] to assist with reaching prior level of function. [x] Progressing: [] Met: [] Not Met: [] Adjusted  2. Patient will demonstrate increased AROM to 150 flex, 90 ER and 70 IR to allow for proper joint functioning as indicated by patients Functional Deficits. [x] Progressing: [] Met: [] Not Met: [] Adjusted  3. Patient will demonstrate an increase in Strength to 4+/ 5 to allow for proper functional mobility as indicated by patients Functional Deficits. [x] Progressing: [] Met: [] Not Met: [] Adjusted  4. Patient will return to sleeping for 7-8 hours without increased symptoms or restriction. [] Progressing: [] Met: [] Not Met: [] Adjusted  5. Able dress, drive, and complete light household work without increased symptoms or restrictions. [] Progressing: [] Met: [] Not Met: [] Adjusted    Progression Towards Functional goals:  [x] Patient is progressing as expected towards functional goals listed. [] Progression is slowed due to complexities listed. [] Progression has been slowed due to co-morbidities. [] Plan just implemented, too soon to assess goals progression  [] Other:     ASSESSMENT: Pt demonstrated the most weakness with post cuff and scapular exercises. Pt was fatigued by end of visit. Pt inquired about back exercises. Overall Progression Towards Functional goals/ Treatment Progress Update:  [x] Patient is progressing as expected towards functional goals listed. [] Progression is slowed due to complexities/Impairments listed. [] Progression has been slowed due to co-morbidities.   [] Plan just implemented, too soon to assess goals progression <30days   [] Goals require adjustment due to lack of progress  [] Patient is not progressing as expected and requires additional follow up with physician  [] Other    Prognosis for POC: [x] Good [] Fair  [] Poor      Patient requires continued skilled intervention: [x] Yes  [] No    Treatment/Activity Tolerance:  [x] Patient able to complete treatment  [] Patient limited by fatigue  [] Patient limited by pain    [] Patient limited by other medical complications  [] Other:          PLAN: See eval  [x] Continue per plan of care [] Alter current plan (see comments above)  [] Plan of care initiated [] Hold pending MD visit [] Discharge      Electronically signed by:  Issa Thomson PT    Note: If patient does not return for scheduled/ recommended follow up visits, this note will serve as a discharge from care along with most recent update on progress.

## 2021-03-08 ENCOUNTER — HOSPITAL ENCOUNTER (OUTPATIENT)
Dept: PHYSICAL THERAPY | Age: 56
Setting detail: THERAPIES SERIES
Discharge: HOME | End: 2021-03-08
Payer: COMMERCIAL

## 2021-03-08 PROCEDURE — 97110 THERAPEUTIC EXERCISES: CPT | Performed by: PHYSICAL THERAPIST

## 2021-03-08 PROCEDURE — 97140 MANUAL THERAPY 1/> REGIONS: CPT | Performed by: PHYSICAL THERAPIST

## 2021-03-08 NOTE — FLOWSHEET NOTE
[facility],  [address] [place]  Phone: [phone]  Fax [phone]        Date:  3/8/2021    Patient Name:  Soo Almaraz    :  1965  MRN: [id number]  Restrictions/Precautions:    Medical/Treatment Diagnosis Information:  · Diagnosis: right shoulder pain  M25.511    s/p SAD with open biceps tenodesis   DOS:  12/15/20  · Treatment Diagnosis: right shoulder pain  Y95.298  Insurance/Certification information:  PT Insurance Information: [facility] -$1500 deductible. 40 PT allowed, no auth. Physician Information:  Referring Practitioner: Dr. Son Madrigal  Has the plan of care been signed (Y/N):        []  Yes  [x]  No     Date of Patient follow up with Physician: 20      Is this a Progress Report:     [x]  Yes  [x]  No        If Yes:  Date Range for reporting period:  Beginnin20  Ending:     Progress report will be due (10 Rx or 30 days whichever is less):        Recertification will be due (POC Duration  / 90 days whichever is less):       Visit # Insurance Allowable Auth Required   In-person 10 40 []  Yes [x]  No    Telehealth   []  Yes []  No    Total          Therapy Diagnosis/Practice Pattern:I       Number of Comorbidities:  [x]0     []1-2    []3+    Latex Allergy:  [x]NO      []YES  Preferred Language for Healthcare:   [x]English       []other:    SUBJECTIVE:   Pt is 13 weeks post op. Pt states that he is going to the gym. He said little things irritate the shoulder like hanging clothes, carrying groceries, and cleaning his back. Pt is better able to sleep.    OBJECTIVE:    Observation:   Test measurements:        ROM Right current   Shoulder Flex 60 150   Shoulder Abd      Shoulder ER 10 90   Shoulder IR   58   Elbow flex 135    Elbow ext Lacking 10           Strength Pt d Right    Shoulder Flex n/a    Shoulder Scap n/a    Shoulder ER n/a    Shoulder IR n/a           Pain scale 2-7 / 10 2 Quick Dash 45 = 77% 23  =  27%       RESTRICTIONS/PRECAUTIONS: biceps tenodesis. Pt said Dr is weaning him from sling. Pt understood this still means NO lifting at all with right UE. Exercises/Interventions:     Therapeutic Ex (24407) Sets/sec Reps Notes/CUES         Supine pec s :20 3x    Supine angels  X 10     Cane flex  X 10 hep   Supine post cap s :20 3x  hep   Serratus punch 8# X 30  hep         Sleeper s : 20  3x  hep   Supine hor abd/    Supine PNF GR x 20 GR x 15 hep   Unable       Prone row 4 step 3# X 20  hep   Sleeper s : 10 5x   hep   pulleys  X 20     Ball on wall  nv          Wall push ups  X 20  Table    Biceps curl 6#  x 30    IR s with towel :10 5x hep   TB row/  TB  ext BL x 30 BL x 30     Wall walks  X 10     TB IR/ ER  GR  3 x 10     Biceps s doorway :20 3x        Manual Intervention (36094)      PROM    8 min In painfree range                     NMR re-education (11392)   CUES NEEDED                                       Therapeutic Activity (18844)                        Access Code: [facility]   URL: [email or web address]   Date: 03/08/2021   Prepared by: Tha Tan     Exercises   Supine Shoulder Flexion Extension AAROM with Dowel - 10 reps - 1 sets - 5 hold - 1x daily - 7x weekly   Supine Scapular Protraction in Flexion with Dumbbells - 10 reps - 3 sets - 1x daily - 7x weekly   Supine Cross Body Shoulder Stretch - 3 reps - 1 sets - 20 hold - 1x daily - 7x weekly   Sleeper Stretch - 5 reps - 1 sets - 10 hold - 1x daily - 7x weekly   Supine Shoulder Horizontal Abduction with Resistance - 10 reps - 3 sets - 1x daily - 7x weekly   Supine PNF D2 Flexion with Resistance - 10 reps - 3 sets - 1x daily - 7x weekly   Sidelying Shoulder ER with Towel and Dumbbell - 10 reps - 3 sets - 1x daily - 7x weekly   Prone Shoulder Extension - Single Arm - 10 reps - 3 sets - 1x daily - 7x weekly   Shoulder Flexion Wall Walk - 10 reps - 1 sets - 5 hold - 1x daily - 7x weekly   Shoulder Internal Rotation with Resistance - 10 reps - 3 sets - 1x daily - 7x weekly   Shoulder External Rotation with Anchored Resistance - 10 reps - 3 sets - 1x daily - 7x weekly   Standing Shoulder Internal Rotation Stretch with Towel - 5 reps - 1 sets - 10 hold - 1x daily - 7x weekly     Therapeutic Exercise and NMR EXR  [x] (26961) Provided verbal/tactile cueing for activities related to strengthening, flexibility, endurance, ROM  for improvements in scapular, scapulothoracic and UE control with self care, reaching, carrying, lifting, house/yardwork, driving/computer work.    [] (85400) Provided verbal/tactile cueing for activities related to improving balance, coordination, kinesthetic sense, posture, motor skill, proprioception  to assist with  scapular, scapulothoracic and UE control with self care, reaching, carrying, lifting, house/yardwork, driving/computer work. Therapeutic Activities:    [] (59995 or 57552) Provided verbal/tactile cueing for activities related to improving balance, coordination, kinesthetic sense, posture, motor skill, proprioception and motor activation to allow for proper function of scapular, scapulothoracic and UE control with self care, carrying, lifting, driving/computer work.      Home Exercise Program:    [x] (40587) Reviewed/Progressed HEP activities related to strengthening, flexibility, endurance, ROM of scapular, scapulothoracic and UE control with self care, reaching, carrying, lifting, house/yardwork, driving/computer work  [] (06493) Reviewed/Progressed HEP activities related to improving balance, coordination, kinesthetic sense, posture, motor skill, proprioception of scapular, scapulothoracic and UE control with self care, reaching, carrying, lifting, house/yardwork, driving/computer work      Manual Treatments:  PROM / STM / Oscillations-Mobs:  G-I, II, III, IV (PA's, Inf., Post.)  [x] (41558) Provided manual therapy to mobilize soft tissue/joints of cervical/CT, scapular GHJ and UE for the purpose of modulating pain, promoting relaxation,  increasing ROM, reducing/eliminating soft tissue swelling/inflammation/restriction, improving soft tissue extensibility and allowing for proper ROM for normal function with self care, reaching, carrying, lifting, house/yardwork, driving/computer work    Modalities:      Charges:  Timed Code Treatment Minutes: 45   Total Treatment Minutes: 55       [] EVAL (LOW) 92092   [] EVAL (MOD) 81162   [] EVAL (HIGH) 00789   [] RE-EVAL     [x] CP(41643) x   2  [] IONTO  [] NMR (56426) x     [] VASO  [x] Manual (72907) x 1     [x] Other:ice  [] TA x      [] Mech Traction (58734)  [] ES(attended) (88394)      [] ES (un) (25372):     GOALS:  Patient stated goal: abolish shoulder pain. Return to work. [] Progressing: [] Met: [] Not Met: [] Adjusted    Therapist goals for Patient:   Short Term Goals: To be achieved in: 2 weeks  1. Independent in HEP and progression per patient tolerance, in order to prevent re-injury. [] Progressing: [] Met: [] Not Met: [] Adjusted  2. Patient will have a decrease in pain to facilitate improvement in movement, function, and ADLs as indicated by Functional Deficits. [] Progressing: [] Met: [] Not Met: [] Adjusted    Long Term Goals: To be achieved in: 12 weeks  1. Disability index score of 25% or less for the [facility] to assist with reaching prior level of function. [x] Progressing: [] Met: [] Not Met: [] Adjusted  2. Patient will demonstrate increased AROM to 150 flex, 90 ER and 70 IR to allow for proper joint functioning as indicated by patients Functional Deficits. [x] Progressing: [] Met: [] Not Met: [] Adjusted  3. Patient will demonstrate an increase in Strength to 4+/ 5 to allow for proper functional mobility as indicated by patients Functional Deficits. [x] Progressing: [] Met: [] Not Met: [] Adjusted  4. Patient will return to sleeping for 7-8 hours without increased symptoms or restriction.    [] Progressing:

## 2021-03-19 ENCOUNTER — HOSPITAL ENCOUNTER (OUTPATIENT)
Dept: PHYSICAL THERAPY | Age: 56
Setting detail: THERAPIES SERIES
Discharge: HOME | End: 2021-03-19
Payer: COMMERCIAL

## 2021-03-19 PROCEDURE — 97110 THERAPEUTIC EXERCISES: CPT | Performed by: PHYSICAL THERAPIST

## 2021-03-19 PROCEDURE — 97140 MANUAL THERAPY 1/> REGIONS: CPT | Performed by: PHYSICAL THERAPIST

## 2021-03-19 NOTE — FLOWSHEET NOTE
[facility], [address] [place]  Phone: [phone]  Fax [phone]        Date:  3/19/2021    Patient Name:  Tunde Miner    :  1965  MRN: [id number]  Restrictions/Precautions:    Medical/Treatment Diagnosis Information:  · Diagnosis: right shoulder pain  M25.511    s/p SAD with open biceps tenodesis   DOS:  12/15/20  · Treatment Diagnosis: right shoulder pain  T31.201  Insurance/Certification information:  PT Insurance Information: [facility] -$1500 deductible. 40 PT allowed, no auth. Physician Information:  Referring Practitioner: Dr. Nghia Wolf  Has the plan of care been signed (Y/N):        []  Yes  [x]  No     Date of Patient follow up with Physician: 20      Is this a Progress Report:     []  Yes  [x]  No        If Yes:  Date Range for reporting period:  Beginnin20  Ending:  3/8/21    Progress report will be due (10 Rx or 30 days whichever is less):        Recertification will be due (POC Duration  / 90 days whichever is less):       Visit # Insurance Allowable Auth Required   In-person 11 40 []  Yes [x]  No    Telehealth   []  Yes []  No    Total          Therapy Diagnosis/Practice Pattern:I       Number of Comorbidities:  [x]0     []1-2    []3+    Latex Allergy:  [x]NO      []YES  Preferred Language for Healthcare:   [x]English       []other:    SUBJECTIVE:   Pt is 14 weeks post op. Pt is still having difficulty reaching behind back. Pt did drive to and from [facility]. He is sleeping well.       OBJECTIVE:    Observation:   Test measurements:       ROM Right current   Shoulder Flex 60 150   Shoulder Abd      Shoulder ER 10 90   Shoulder IR   58   Elbow flex 135    Elbow ext Lacking 10           Strength Pt d Right    Shoulder Flex n/a    Shoulder Scap n/a    Shoulder ER n/a    Shoulder IR n/a           Pain scale 2-7 / 10 2   Quick Dash 45 = 77% 23  =  27%       RESTRICTIONS/PRECAUTIONS: biceps tenodesis. Pt said Dr is weaning him from sling. Pt understood this still means NO lifting at all with right UE. Exercises/Interventions:     Therapeutic Ex (77205) Sets/sec Reps Notes/CUES         Supine pec s :20 3x    Supine angels  X 10     hep   Supine post cap s :20 3x  hep   Serratus punch 8# X 30  hep      Supine punch 8# 3 x 10     Sleeper s : 20  5x  hep   Supine hor abd/    Supine PNF Unable    hep   Sleeper s : 10 5x   hep      Ball on wall  X 20     True sTretch  X 10  3 ways   Wall push ups  X 20  Table    Functional lift 8# 2 x 10     IR s with towel :10 5x hep   TB row/  TB  ext BL x 30 BL x 30     Wall walks  X 10     TB IR/ ER  GR  3 x 10     Biceps s doorway :20 3x     Manual Intervention (43303)      PROM    8 min                      NMR re-education (75205)   CUES NEEDED                                       Therapeutic Activity (21615)                        Access Code: [facility]   URL: [email or web address]   Date: 03/08/2021   Prepared by: Xochilt Rene     Exercises   Supine Shoulder Flexion Extension AAROM with Dowel - 10 reps - 1 sets - 5 hold - 1x daily - 7x weekly   Supine Scapular Protraction in Flexion with Dumbbells - 10 reps - 3 sets - 1x daily - 7x weekly   Supine Cross Body Shoulder Stretch - 3 reps - 1 sets - 20 hold - 1x daily - 7x weekly   Sleeper Stretch - 5 reps - 1 sets - 10 hold - 1x daily - 7x weekly   Supine Shoulder Horizontal Abduction with Resistance - 10 reps - 3 sets - 1x daily - 7x weekly   Supine PNF D2 Flexion with Resistance - 10 reps - 3 sets - 1x daily - 7x weekly   Sidelying Shoulder ER with Towel and Dumbbell - 10 reps - 3 sets - 1x daily - 7x weekly   Prone Shoulder Extension - Single Arm - 10 reps - 3 sets - 1x daily - 7x weekly   Shoulder Flexion Wall Walk - 10 reps - 1 sets - 5 hold - 1x daily - 7x weekly   Shoulder Internal Rotation with Resistance - 10 reps - 3 sets - 1x daily - 7x weekly   Shoulder External Rotation with Anchored Resistance - 10 reps - 3 sets - 1x daily - 7x weekly   Standing Shoulder Internal Rotation Stretch with Towel - 5 reps - 1 sets - 10 hold - 1x daily - 7x weekly     Therapeutic Exercise and NMR EXR  [x] (40865) Provided verbal/tactile cueing for activities related to strengthening, flexibility, endurance, ROM  for improvements in scapular, scapulothoracic and UE control with self care, reaching, carrying, lifting, house/yardwork, driving/computer work.    [] (06382) Provided verbal/tactile cueing for activities related to improving balance, coordination, kinesthetic sense, posture, motor skill, proprioception  to assist with  scapular, scapulothoracic and UE control with self care, reaching, carrying, lifting, house/yardwork, driving/computer work. Therapeutic Activities:    [] (68419 or 86403) Provided verbal/tactile cueing for activities related to improving balance, coordination, kinesthetic sense, posture, motor skill, proprioception and motor activation to allow for proper function of scapular, scapulothoracic and UE control with self care, carrying, lifting, driving/computer work.      Home Exercise Program:    [x] (76786) Reviewed/Progressed HEP activities related to strengthening, flexibility, endurance, ROM of scapular, scapulothoracic and UE control with self care, reaching, carrying, lifting, house/yardwork, driving/computer work  [] (19887) Reviewed/Progressed HEP activities related to improving balance, coordination, kinesthetic sense, posture, motor skill, proprioception of scapular, scapulothoracic and UE control with self care, reaching, carrying, lifting, house/yardwork, driving/computer work      Manual Treatments:  PROM / STM / Oscillations-Mobs:  G-I, II, III, IV (PA's, Inf., Post.)  [x] (96707) Provided manual therapy to mobilize soft tissue/joints of cervical/CT, scapular GHJ and UE for the purpose of modulating pain, promoting relaxation,  increasing ROM, reducing/eliminating soft tissue swelling/inflammation/restriction, improving soft tissue extensibility and allowing for proper ROM for normal function with self care, reaching, carrying, lifting, house/yardwork, driving/computer work    Modalities:      Charges:  Timed Code Treatment Minutes: 45   Total Treatment Minutes: 45       [] EVAL (LOW) 38034   [] EVAL (MOD) 33766   [] EVAL (HIGH) 76805   [] RE-EVAL     [x] LN(86252) x   2  [] IONTO  [] NMR (35888) x     [] VASO  [x] Manual (75282) x 1     [] Other:ice  [] TA x      [] Mech Traction (37436)  [] ES(attended) (36022)      [] ES (un) (59021):     GOALS:  Patient stated goal: abolish shoulder pain. Return to work. [] Progressing: [] Met: [] Not Met: [] Adjusted    Therapist goals for Patient:   Short Term Goals: To be achieved in: 2 weeks  1. Independent in HEP and progression per patient tolerance, in order to prevent re-injury. [] Progressing: [] Met: [] Not Met: [] Adjusted  2. Patient will have a decrease in pain to facilitate improvement in movement, function, and ADLs as indicated by Functional Deficits. [] Progressing: [] Met: [] Not Met: [] Adjusted    Long Term Goals: To be achieved in: 12 weeks  1. Disability index score of 25% or less for the [facility] to assist with reaching prior level of function. [x] Progressing: [] Met: [] Not Met: [] Adjusted  2. Patient will demonstrate increased AROM to 150 flex, 90 ER and 70 IR to allow for proper joint functioning as indicated by patients Functional Deficits. [x] Progressing: [] Met: [] Not Met: [] Adjusted  3. Patient will demonstrate an increase in Strength to 4+/ 5 to allow for proper functional mobility as indicated by patients Functional Deficits. [x] Progressing: [] Met: [] Not Met: [] Adjusted  4. Patient will return to sleeping for 7-8 hours without increased symptoms or restriction. [] Progressing: [x] Met: [] Not Met: [] Adjusted  5.  Able dress, drive, and complete light household work without increased symptoms or restrictions. [] Progressing: [x] Met: [] Not Met: [] Adjusted    Progression Towards Functional goals:  [x] Patient is progressing as expected towards functional goals listed. [] Progression is slowed due to complexities listed. [] Progression has been slowed due to co-morbidities. [] Plan just implemented, too soon to assess goals progression  [] Other:     ASSESSMENT: Pt demonstrated increased IR by end of visit. Overall Progression Towards Functional goals/ Treatment Progress Update:  [x] Patient is progressing as expected towards functional goals listed. [] Progression is slowed due to complexities/Impairments listed. [] Progression has been slowed due to co-morbidities. [] Plan just implemented, too soon to assess goals progression <30days   [] Goals require adjustment due to lack of progress  [] Patient is not progressing as expected and requires additional follow up with physician  [] Other    Prognosis for POC: [x] Good [] Fair  [] Poor      Patient requires continued skilled intervention: [x] Yes  [] No    Treatment/Activity Tolerance:  [x] Patient able to complete treatment  [] Patient limited by fatigue  [] Patient limited by pain    [] Patient limited by other medical complications  [] Other:          PLAN:   [x] Continue per plan of care [] Alter current plan (see comments above)  [] Plan of care initiated [] Hold pending MD visit [] Discharge      Electronically signed by:  Richa Bear, PT    Note: If patient does not return for scheduled/ recommended follow up visits, this note will serve as a discharge from care along with most recent update on progress.

## 2021-03-23 ENCOUNTER — HOSPITAL ENCOUNTER (OUTPATIENT)
Dept: PHYSICAL THERAPY | Age: 56
Setting detail: THERAPIES SERIES
Discharge: HOME | End: 2021-03-23
Payer: COMMERCIAL

## 2021-03-23 PROCEDURE — 97110 THERAPEUTIC EXERCISES: CPT | Performed by: PHYSICAL THERAPIST

## 2021-03-23 PROCEDURE — 97140 MANUAL THERAPY 1/> REGIONS: CPT | Performed by: PHYSICAL THERAPIST

## 2021-03-23 NOTE — FLOWSHEET NOTE
[facility], [address]  Phone: [phone]  Fax [phone]        Date:  3/23/2021    Patient Name:  Tavia Rico    :  1965  MRN: [id number]  Restrictions/Precautions:    Medical/Treatment Diagnosis Information:  · Diagnosis: right shoulder pain  M25.511    s/p SAD with open biceps tenodesis   DOS:  12/15/20  · Treatment Diagnosis: right shoulder pain  Y60.397  Insurance/Certification information:  PT Insurance Information: [facility] -$1500 deductible. 40 PT allowed, no auth. Physician Information:  Referring Practitioner: Dr. Catherine Au  Has the plan of care been signed (Y/N):        []  Yes  [x]  No     Date of Patient follow up with Physician: 20      Is this a Progress Report:     []  Yes  [x]  No        If Yes:  Date Range for reporting period:  Beginnin20  Ending:  3/8/21    Progress report will be due (10 Rx or 30 days whichever is less):        Recertification will be due (POC Duration  / 90 days whichever is less):       Visit # Insurance Allowable Auth Required   In-person 12 40 []  Yes [x]  No    Telehealth   []  Yes []  No    Total          Therapy Diagnosis/Practice Pattern:I       Number of Comorbidities:  [x]0     []1-2    []3+    Latex Allergy:  [x]NO      []YES  Preferred Language for Healthcare:   [x]English       []other:    SUBJECTIVE:   Pt is 15 weeks post op. Pt was doing to yard work this weekend. He reached for his wife's purse when they were leaving the house, and he felt that he had no strength.      OBJECTIVE:    Observation:   Test measurements:       ROM Right current   Shoulder Flex 60 150   Shoulder Abd      Shoulder ER 10 90   Shoulder IR   58   Elbow flex 135    Elbow ext Lacking 10           Strength Pt d Right    Shoulder Flex n/a    Shoulder Scap n/a    Shoulder ER n/a    Shoulder IR n/a           Pain scale 2-7 / 10 2   Quick Dash 45 = 77% 19 =  18%       RESTRICTIONS/PRECAUTIONS: biceps tenodesis. Pt said Dr is weaning him from sling. Pt understood this still means NO lifting at all with right UE. Exercises/Interventions:     Therapeutic Ex (12140) Sets/sec Reps Notes/CUES               hep   hep   Serratus punch 10# X 30  hep   Supine R/s  4 directions 10# X 10     Supine punch 10# 3 x 10     Sleeper s : 20  5x  hep   Supine hor abd/    Supine PNF Unable    Bent over row/  Bent over kickback 10# x 30 8# x 30     Sleeper s : 10 5x   hep      Ball on wall  X 20     True sTretch  X 10  3 ways   Wall push ups  X 30  Table    Functional lift 8# 2 x 10     IR s with towel :10 5x hep   TB row/  TB  ext BK x 30 BK x 30     Wall walks  X 5     Corner s :20  3x     TB IR/ ER  BL  3 x 10     1/2 kneeling  horiz abd 8# 2 x 10     Biceps s doorway :20 3x     Manual Intervention (17301)      PROM  /  Mobs/   STM  8 min                            NMR re-education (25085)   CUES NEEDED                                       Therapeutic Activity (67536)                        Access Code: [facility]   URL: [email or web address]   Date: 03/08/2021   Prepared by: Tha Shank     Exercises   Supine Shoulder Flexion Extension AAROM with Dowel - 10 reps - 1 sets - 5 hold - 1x daily - 7x weekly   Supine Scapular Protraction in Flexion with Dumbbells - 10 reps - 3 sets - 1x daily - 7x weekly   Supine Cross Body Shoulder Stretch - 3 reps - 1 sets - 20 hold - 1x daily - 7x weekly   Sleeper Stretch - 5 reps - 1 sets - 10 hold - 1x daily - 7x weekly   Supine Shoulder Horizontal Abduction with Resistance - 10 reps - 3 sets - 1x daily - 7x weekly   Supine PNF D2 Flexion with Resistance - 10 reps - 3 sets - 1x daily - 7x weekly   Sidelying Shoulder ER with Towel and Dumbbell - 10 reps - 3 sets - 1x daily - 7x weekly   Prone Shoulder Extension - Single Arm - 10 reps - 3 sets - 1x daily - 7x weekly   Shoulder Flexion Wall Walk - 10 reps - 1 sets - 5 hold - 1x daily - 7x weekly   Shoulder Internal Rotation with Resistance - 10 reps - 3 sets - 1x daily - 7x weekly   Shoulder External Rotation with Anchored Resistance - 10 reps - 3 sets - 1x daily - 7x weekly   Standing Shoulder Internal Rotation Stretch with Towel - 5 reps - 1 sets - 10 hold - 1x daily - 7x weekly     Therapeutic Exercise and NMR EXR  [x] (69792) Provided verbal/tactile cueing for activities related to strengthening, flexibility, endurance, ROM  for improvements in scapular, scapulothoracic and UE control with self care, reaching, carrying, lifting, house/yardwork, driving/computer work.    [] (41036) Provided verbal/tactile cueing for activities related to improving balance, coordination, kinesthetic sense, posture, motor skill, proprioception  to assist with  scapular, scapulothoracic and UE control with self care, reaching, carrying, lifting, house/yardwork, driving/computer work. Therapeutic Activities:    [] (80538 or 27822) Provided verbal/tactile cueing for activities related to improving balance, coordination, kinesthetic sense, posture, motor skill, proprioception and motor activation to allow for proper function of scapular, scapulothoracic and UE control with self care, carrying, lifting, driving/computer work.      Home Exercise Program:    [x] (81538) Reviewed/Progressed HEP activities related to strengthening, flexibility, endurance, ROM of scapular, scapulothoracic and UE control with self care, reaching, carrying, lifting, house/yardwork, driving/computer work  [] (60712) Reviewed/Progressed HEP activities related to improving balance, coordination, kinesthetic sense, posture, motor skill, proprioception of scapular, scapulothoracic and UE control with self care, reaching, carrying, lifting, house/yardwork, driving/computer work      Manual Treatments:  PROM / STM / Oscillations-Mobs:  G-I, II, III, IV (PA's, Inf., Post.)  [x] (27871) Provided manual therapy to mobilize soft tissue/joints of cervical/CT, scapular GHJ and UE for the purpose of modulating pain, promoting relaxation,  increasing ROM, reducing/eliminating soft tissue swelling/inflammation/restriction, improving soft tissue extensibility and allowing for proper ROM for normal function with self care, reaching, carrying, lifting, house/yardwork, driving/computer work    Modalities:      Charges:  Timed Code Treatment Minutes: 45   Total Treatment Minutes: 45       [] EVAL (LOW) 36641   [] EVAL (MOD) 06192   [] EVAL (HIGH) 90870   [] RE-EVAL     [x] IX(82566) x   2  [] IONTO  [] NMR (29966) x     [] VASO  [x] Manual (62401) x 1     [] Other:ice  [] TA x      [] Mech Traction (31226)  [] ES(attended) (91842)      [] ES (un) (00932):     GOALS:  Patient stated goal: abolish shoulder pain. Return to work. [] Progressing: [] Met: [] Not Met: [] Adjusted    Therapist goals for Patient:   Short Term Goals: To be achieved in: 2 weeks  1. Independent in HEP and progression per patient tolerance, in order to prevent re-injury. [] Progressing: [] Met: [] Not Met: [] Adjusted  2. Patient will have a decrease in pain to facilitate improvement in movement, function, and ADLs as indicated by Functional Deficits. [] Progressing: [] Met: [] Not Met: [] Adjusted    Long Term Goals: To be achieved in: 12 weeks  1. Disability index score of 25% or less for the [facility] to assist with reaching prior level of function. [x] Progressing: [] Met: [] Not Met: [] Adjusted  2. Patient will demonstrate increased AROM to 150 flex, 90 ER and 70 IR to allow for proper joint functioning as indicated by patients Functional Deficits. [x] Progressing: [] Met: [] Not Met: [] Adjusted  3. Patient will demonstrate an increase in Strength to 4+/ 5 to allow for proper functional mobility as indicated by patients Functional Deficits. [x] Progressing: [] Met: [] Not Met: [] Adjusted  4.  Patient will return to sleeping for 7-8 hours without increased symptoms or restriction. [] Progressing: [x] Met: [] Not Met: [] Adjusted  5. Able dress, drive, and complete light household work without increased symptoms or restrictions. [] Progressing: [x] Met: [] Not Met: [] Adjusted    Progression Towards Functional goals:  [x] Patient is progressing as expected towards functional goals listed. [] Progression is slowed due to complexities listed. [] Progression has been slowed due to co-morbidities. [] Plan just implemented, too soon to assess goals progression  [] Other:     ASSESSMENT:  Pt was able to perform increased reps and increased weights with exercise. Overall Progression Towards Functional goals/ Treatment Progress Update:  [x] Patient is progressing as expected towards functional goals listed. [] Progression is slowed due to complexities/Impairments listed. [] Progression has been slowed due to co-morbidities. [] Plan just implemented, too soon to assess goals progression <30days   [] Goals require adjustment due to lack of progress  [] Patient is not progressing as expected and requires additional follow up with physician  [] Other    Prognosis for POC: [x] Good [] Fair  [] Poor      Patient requires continued skilled intervention: [x] Yes  [] No    Treatment/Activity Tolerance:  [x] Patient able to complete treatment  [] Patient limited by fatigue  [] Patient limited by pain    [] Patient limited by other medical complications  [] Other:          PLAN:   [x] Continue per plan of care [] Alter current plan (see comments above)  [] Plan of care initiated [] Hold pending MD visit [] Discharge      Electronically signed by:  Ismael Fry, PT    Note: If patient does not return for scheduled/ recommended follow up visits, this note will serve as a discharge from care along with most recent update on progress.

## 2021-03-25 ENCOUNTER — HOSPITAL ENCOUNTER (OUTPATIENT)
Dept: PHYSICAL THERAPY | Age: 56
Setting detail: THERAPIES SERIES
Discharge: HOME | End: 2021-03-25
Payer: COMMERCIAL

## 2021-03-25 PROCEDURE — 97110 THERAPEUTIC EXERCISES: CPT | Performed by: PHYSICAL THERAPIST

## 2021-03-25 PROCEDURE — 97140 MANUAL THERAPY 1/> REGIONS: CPT | Performed by: PHYSICAL THERAPIST

## 2021-03-25 NOTE — OP NOTE
[facility], [address] [place]  Phone: [phone]  Fax [phone]    Date: 3/25/2021  Physician: Dr. Josué Rodriguez  Patient: Ulysses Powell Diagnosis: right shoulder pain  M25.511    s/p SAD with open biceps tenodesis   DOS:  12/15/20    Patient has received 13 sessions of Physical Therapy over a 3 month period. Functional Questionnaire Score: Initial 77%, Current 18%  Pain reported has decreased from 2-7/10 to 2/10    ROM Initial (R) Initial (L) Current (R) Current (L)   PROM flexion 60  155 PROM/ 148 AROM    ER 10  82 PROM (hightest 90 deg) / T-1 AROM    IR   68 deg PROM / T-12 AROM    Abd   165 AROM            Strength       Flex   4+    ABD   4    ER   4    IR    4+             Functional Activity Checklist: The patient continues to have moderate difficulty with the following:   [] Personal care  [x] Reaching / overhead  [] Standing    [] Housework chores  [] Climbing  [] Driving / riding in a vehicle    [x] Work  [] Squatting  [] Bed / vehicle mobility    [x] Lifting  [] Walking  [] Sleeping    [x] Pushing / pulling  [] Sitting  [] Concentrating / reading     Specific Functional Improvement and Impression:  Hoping to get few more weeks off to improve strengthening. Feels 80% back to full function. Limited with behind the back motion. Strength improving. At gym performing weight machines for UE strengthening. Patient more active around home with repetitive tasks such as painting closet this week. End range motion continues to be limited and painful.   Patient worried about return to work as he has to be completely unrestricted and may have heavy tasks to perform.        Summary:   [x] Patient is progressing as expected for this condition   [] Patient is progressing, but slower than expected for this condition   [] Patient is not progressing  Clinician Recommendations:   [x] Continue rehabilitation due to objective improvement and continued functional deficits. [] Follow up periodically to advance home exercise program to match level of function. [] Continue rehabitation due to objective improvement and continued functional deficits with progression to work conditioning. [] Discharge to post-rehab program secondary to maximizing \"medical necessity\" of physical / occupational therapy.    [] Discharge independent in a home program as:  [] All goals achieved  [] Maximized \"medical necessity\" of physical / occupational therapy  [] No subjective or objective improvements    Plan: cont 2 x week/ 3 weeks progressing strength program and functional work tasks to prepare for RTW    Electronically signed by: Lucio Schaefer PT   License #: [id number]  Physician Recommendations:  [] Follow treatment plan as above [] Discontinue physical therapy  [] Change plan to: _______________________________________________________________

## 2021-03-25 NOTE — FLOWSHEET NOTE
[facility],  [address]  Phone: [phone]  Fax [phone]        Date:  3/25/2021    Patient Name:  Ulysses Powell    :  1965  MRN: [id number]  Restrictions/Precautions:    Medical/Treatment Diagnosis Information:  · Diagnosis: right shoulder pain  M25.511    s/p SAD with open biceps tenodesis   DOS:  12/15/20  · Treatment Diagnosis: right shoulder pain  K46.941  Insurance/Certification information:  PT Insurance Information: [facility] -$1500 deductible. 40 PT allowed, no auth. Physician Information:  Referring Practitioner: Dr. Gee Johnson  Has the plan of care been signed (Y/N):        []  Yes  [x]  No     Date of Patient follow up with Physician: 20      Is this a Progress Report:     []  Yes  [x]  No        If Yes:  Date Range for reporting period:  Beginnin20  Ending:  3/8/21    Progress report will be due (10 Rx or 30 days whichever is less):        Recertification will be due (POC Duration  / 90 days whichever is less):       Visit # Insurance Allowable Auth Required   In-person 13 40 []  Yes [x]  No    Telehealth   []  Yes []  No    Total          Therapy Diagnosis/Practice Pattern:I       Number of Comorbidities:  [x]0     []1-2    []3+    Latex Allergy:  [x]NO      []YES  Preferred Language for Healthcare:   [x]English       []other:    SUBJECTIVE:   Patient goes back to MD tomorrow. Hoping to get few more weeks off to improve strengthening. Feels 80% back to full function. Limited with behind the back motion. Strength improving. At gym performing weight machines. Patient reported he painted yesterday and he is sore today from that.      OBJECTIVE:    Observation:   Test measurements:  ROM Initial (R) Current (R)   PROM flexion 60 155 PROM/ 148 AROM   ER 10 82 PROM (hightest 90 deg) / T-1 AROM   IR   68 deg PROM / T-12 AROM   Abd   165 AROM            Strength       Flex   4+   ABD   4   ER   4   IR    4+                          Pain scale 2-7 / 10 2   Quick Dash 45 = 77% 19  =  18%       RESTRICTIONS/PRECAUTIONS: biceps tenodesis. Pt said Dr is weaning him from sling. Pt understood this still means NO lifting at all with right UE. Exercises/Interventions:     Therapeutic Ex (55510) Sets/sec Reps Notes/CUES               hep   hep   LLLD ER stretch 20\"  4 x     Serratus punch 10# X 30  hep   Supine R/s  4 directions 10# X 10     Supine punch 10# 3 x 10     Sleeper s : 20  5x  hep   Supine hor abd/    Supine PNF Unable    Bent over row/  Bent over kickback 10# x 30 8# x 30              Ball on wall  X 20     True sTretch   3 ways   Table push ups  X 30  Table    Functional lift 8# 3 x 10     IR s with towel :10 5x hep      Wall walks     Corner s :20  4 x     TB IR/ ER  BL  3 x 10     1/2 kneeling  horiz abd 8# 2 x 10     Biceps s doorway :20 3x     Manual Intervention (24612)      PROM  /  Mobs/   STM  8 min                            NMR re-education (81622)   CUES NEEDED                                       Therapeutic Activity (22483)                        Access Code: [facility]   URL: [email or web address]   Date: 03/08/2021   Prepared by: Natasha Dunne     Exercises   Supine Shoulder Flexion Extension AAROM with Dowel - 10 reps - 1 sets - 5 hold - 1x daily - 7x weekly   Supine Scapular Protraction in Flexion with Dumbbells - 10 reps - 3 sets - 1x daily - 7x weekly   Supine Cross Body Shoulder Stretch - 3 reps - 1 sets - 20 hold - 1x daily - 7x weekly   Sleeper Stretch - 5 reps - 1 sets - 10 hold - 1x daily - 7x weekly   Supine Shoulder Horizontal Abduction with Resistance - 10 reps - 3 sets - 1x daily - 7x weekly   Supine PNF D2 Flexion with Resistance - 10 reps - 3 sets - 1x daily - 7x weekly   Sidelying Shoulder ER with Towel and Dumbbell - 10 reps - 3 sets - 1x daily - 7x weekly   Prone Shoulder Extension - Single Arm - 10 reps - 3 sets - 1x daily - 7x weekly   Shoulder Flexion Wall Walk - 10 reps - 1 sets - 5 hold - 1x daily - 7x weekly   Shoulder Internal Rotation with Resistance - 10 reps - 3 sets - 1x daily - 7x weekly   Shoulder External Rotation with Anchored Resistance - 10 reps - 3 sets - 1x daily - 7x weekly   Standing Shoulder Internal Rotation Stretch with Towel - 5 reps - 1 sets - 10 hold - 1x daily - 7x weekly     Therapeutic Exercise and NMR EXR  [x] (23633) Provided verbal/tactile cueing for activities related to strengthening, flexibility, endurance, ROM  for improvements in scapular, scapulothoracic and UE control with self care, reaching, carrying, lifting, house/yardwork, driving/computer work.    [] (52304) Provided verbal/tactile cueing for activities related to improving balance, coordination, kinesthetic sense, posture, motor skill, proprioception  to assist with  scapular, scapulothoracic and UE control with self care, reaching, carrying, lifting, house/yardwork, driving/computer work. Therapeutic Activities:    [] (62233 or 16057) Provided verbal/tactile cueing for activities related to improving balance, coordination, kinesthetic sense, posture, motor skill, proprioception and motor activation to allow for proper function of scapular, scapulothoracic and UE control with self care, carrying, lifting, driving/computer work.      Home Exercise Program:    [x] (10534) Reviewed/Progressed HEP activities related to strengthening, flexibility, endurance, ROM of scapular, scapulothoracic and UE control with self care, reaching, carrying, lifting, house/yardwork, driving/computer work  [] (56188) Reviewed/Progressed HEP activities related to improving balance, coordination, kinesthetic sense, posture, motor skill, proprioception of scapular, scapulothoracic and UE control with self care, reaching, carrying, lifting, house/yardwork, driving/computer work      Manual Treatments:  PROM / STM / Oscillations-Mobs:  G-I, II, III, IV (PA's, Inf., Post.)  [x] (21712) Provided manual therapy to mobilize soft tissue/joints of cervical/CT, scapular GHJ and UE for the purpose of modulating pain, promoting relaxation,  increasing ROM, reducing/eliminating soft tissue swelling/inflammation/restriction, improving soft tissue extensibility and allowing for proper ROM for normal function with self care, reaching, carrying, lifting, house/yardwork, driving/computer work    Modalities:      Charges:  Timed Code Treatment Minutes: 45   Total Treatment Minutes: 45       [] EVAL (LOW) 37068   [] EVAL (MOD) 09383   [] EVAL (HIGH) 93804   [] RE-EVAL     [x] OQ(29315) x   2  [] IONTO  [] NMR (22056) x     [] VASO  [x] Manual (48102) x 1     [] Other:ice  [] TA x      [] Mech Traction (79549)  [] ES(attended) (25557)      [] ES (un) (68674):     GOALS:  Patient stated goal: abolish shoulder pain. Return to work. [] Progressing: [] Met: [] Not Met: [] Adjusted    Therapist goals for Patient:   Short Term Goals: To be achieved in: 2 weeks  1. Independent in HEP and progression per patient tolerance, in order to prevent re-injury. [] Progressing: [] Met: [] Not Met: [] Adjusted  2. Patient will have a decrease in pain to facilitate improvement in movement, function, and ADLs as indicated by Functional Deficits. [] Progressing: [] Met: [] Not Met: [] Adjusted    Long Term Goals: To be achieved in: 12 weeks  1. Disability index score of 25% or less for the [facility] to assist with reaching prior level of function. [x] Progressing: [] Met: [] Not Met: [] Adjusted  2. Patient will demonstrate increased AROM to 150 flex, 90 ER and 70 IR to allow for proper joint functioning as indicated by patients Functional Deficits. [x] Progressing: [] Met: [] Not Met: [] Adjusted  3. Patient will demonstrate an increase in Strength to 4+/ 5 to allow for proper functional mobility as indicated by patients Functional Deficits. [x] Progressing: [] Met: [] Not Met: [] Adjusted  4. Patient will return to sleeping for 7-8 hours without increased symptoms or restriction. [] Progressing: [x] Met: [] Not Met: [] Adjusted  5. Able dress, drive, and complete light household work without increased symptoms or restrictions. [] Progressing: [x] Met: [] Not Met: [] Adjusted    Progression Towards Functional goals:  [x] Patient is progressing as expected towards functional goals listed. [] Progression is slowed due to complexities listed. [] Progression has been slowed due to co-morbidities. [] Plan just implemented, too soon to assess goals progression  [] Other:     ASSESSMENT:  See MD note. Recommended continued PT to optimize strength for return to work. Overall Progression Towards Functional goals/ Treatment Progress Update:  [x] Patient is progressing as expected towards functional goals listed. [] Progression is slowed due to complexities/Impairments listed. [] Progression has been slowed due to co-morbidities. [] Plan just implemented, too soon to assess goals progression <30days   [] Goals require adjustment due to lack of progress  [] Patient is not progressing as expected and requires additional follow up with physician  [] Other    Prognosis for POC: [x] Good [] Fair  [] Poor      Patient requires continued skilled intervention: [x] Yes  [] No    Treatment/Activity Tolerance:  [x] Patient able to complete treatment  [] Patient limited by fatigue  [] Patient limited by pain    [] Patient limited by other medical complications  [] Other:          PLAN:   [x] Continue per plan of care [] Alter current plan (see comments above)  [] Plan of care initiated [] Hold pending MD visit [] Discharge      Electronically signed by:  August Louis PT    Note: If patient does not return for scheduled/ recommended follow up visits, this note will serve as a discharge from care along with most recent update on progress.

## 2021-03-29 ENCOUNTER — HOSPITAL ENCOUNTER (OUTPATIENT)
Dept: PHYSICAL THERAPY | Age: 56
Setting detail: THERAPIES SERIES
Discharge: HOME | End: 2021-03-29
Payer: COMMERCIAL

## 2021-03-29 PROCEDURE — 97530 THERAPEUTIC ACTIVITIES: CPT | Performed by: PHYSICAL THERAPIST

## 2021-03-29 PROCEDURE — 97140 MANUAL THERAPY 1/> REGIONS: CPT | Performed by: PHYSICAL THERAPIST

## 2021-03-29 NOTE — FLOWSHEET NOTE
[facility],  [address] [facility]  Phone: [phone]  Fax [phone]        Date:  3/29/2021    Patient Name:  Soo Almaraz    :  1965  MRN: [id number]  Restrictions/Precautions:    Medical/Treatment Diagnosis Information:  · Diagnosis: right shoulder pain  M25.511    s/p SAD with open biceps tenodesis   DOS:  12/15/20  · Treatment Diagnosis: right shoulder pain  Q04.105  Insurance/Certification information:  PT Insurance Information: [facility] -$1500 deductible. 40 PT allowed, no auth. Physician Information:  Referring Practitioner: Dr. Son Madrigal  Has the plan of care been signed (Y/N):        []  Yes  [x]  No     Date of Patient follow up with Physician: 20      Is this a Progress Report:     []  Yes  [x]  No        If Yes:  Date Range for reporting period:  Beginnin20  Ending:  3/8/21    Progress report will be due (10 Rx or 30 days whichever is less):        Recertification will be due (POC Duration  / 90 days whichever is less):       Visit # Insurance Allowable Auth Required   In-person 13 40 []  Yes [x]  No    Telehealth   []  Yes []  No    Total          Therapy Diagnosis/Practice Pattern:I       Number of Comorbidities:  [x]0     []1-2    []3+    Latex Allergy:  [x]NO      []YES  Preferred Language for Healthcare:   [x]English       []other:    SUBJECTIVE:   Patient saw MD last week and stated he was pleased with his progress and wrote him off from work until . 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 Pain is slowly going away, just some issues with the posterior shoulder area during activity.       OBJECTIVE:    Observation:   ROM Initial (R) Current (R)   PROM flexion 60 155 PROM/ 148 AROM   ER 10 82 PROM (hightest 90 deg) / T-1 AROM   IR   68 deg PROM / T-12 AROM   Abd   165 AROM            Strength       Flex   4+   ABD   4   ER   4   IR    4+                          Pain scale 2-7 / 10 2   Quick Dash 45 = 77% 19  =  18%       RESTRICTIONS/PRECAUTIONS: biceps tenodesis. Pt said Dr is weaning him from sling. Pt understood this still means NO lifting at all with right UE. Exercises/Interventions:     Therapeutic Ex (56504) Sets/sec Reps Notes/CUES               hep   hep   LLLD ER stretch    Serratus punch 10# X 30  hep   Supine R/s  4 directions 10# X 10     Supine punch 10# 3 x 10     Sleeper s : 20  5x  hep   Supine hor abd/    Supine PNF Unable    Landmine Press Bar X 15    Sled push/pull 25#  2 laps    Functional lift to middle shelf 37# X 20 nv increase weight    Plyo Back- Chest pass / OH pass 4# MB X 20e    3 way push up on wall  X 10e nv push up variations on table   Chopping/Lifting against wall in mini squat position  4# MB X 10e    Table plank shoulder taps   X 30 nv modified plank position          Bent over row/  Bent over kickback 10# x 30 8# x 30              Ball on wall  X 20     True sTretch   3 ways   Table push ups   Table    Functional lift    IR s with towel :20 5x hep      Wall walks     Corner s    TB IR/ ER  BL  3 x 10     1/2 kneeling  horiz abd    Biceps s doorway    Manual Intervention (95849)      PROM  /  Mobs/   STM  8 min                            NMR re-education (53048)   CUES NEEDED                                       Therapeutic Activity (87011)                        Access Code: [facility]   URL: [email or web address]   Date: 03/08/2021   Prepared by: Ismael Fry     Exercises   Supine Shoulder Flexion Extension AAROM with Dowel - 10 reps - 1 sets - 5 hold - 1x daily - 7x weekly   Supine Scapular Protraction in Flexion with Dumbbells - 10 reps - 3 sets - 1x daily - 7x weekly   Supine Cross Body Shoulder Stretch - 3 reps - 1 sets - 20 hold - 1x daily - 7x weekly   Sleeper Stretch - 5 reps - 1 sets - 10 hold - 1x daily - 7x weekly   Supine Shoulder Horizontal Abduction with Resistance - 10 reps - 3 sets - 1x daily - 7x weekly   Supine PNF D2 Flexion with Resistance - 10 reps - 3 sets - 1x daily - 7x weekly   Sidelying Shoulder ER with Towel and Dumbbell - 10 reps - 3 sets - 1x daily - 7x weekly   Prone Shoulder Extension - Single Arm - 10 reps - 3 sets - 1x daily - 7x weekly   Shoulder Flexion Wall Walk - 10 reps - 1 sets - 5 hold - 1x daily - 7x weekly   Shoulder Internal Rotation with Resistance - 10 reps - 3 sets - 1x daily - 7x weekly   Shoulder External Rotation with Anchored Resistance - 10 reps - 3 sets - 1x daily - 7x weekly   Standing Shoulder Internal Rotation Stretch with Towel - 5 reps - 1 sets - 10 hold - 1x daily - 7x weekly     Therapeutic Exercise and NMR EXR  [x] (60621) Provided verbal/tactile cueing for activities related to strengthening, flexibility, endurance, ROM  for improvements in scapular, scapulothoracic and UE control with self care, reaching, carrying, lifting, house/yardwork, driving/computer work.    [] (05610) Provided verbal/tactile cueing for activities related to improving balance, coordination, kinesthetic sense, posture, motor skill, proprioception  to assist with  scapular, scapulothoracic and UE control with self care, reaching, carrying, lifting, house/yardwork, driving/computer work. Therapeutic Activities:    [] (97294 or 67528) Provided verbal/tactile cueing for activities related to improving balance, coordination, kinesthetic sense, posture, motor skill, proprioception and motor activation to allow for proper function of scapular, scapulothoracic and UE control with self care, carrying, lifting, driving/computer work.      Home Exercise Program:    [x] (21976) Reviewed/Progressed HEP activities related to strengthening, flexibility, endurance, ROM of scapular, scapulothoracic and UE control with self care, reaching, carrying, lifting, house/yardwork, driving/computer work  [] (54554) Reviewed/Progressed HEP activities related to improving balance, coordination, kinesthetic sense, posture, motor skill, proprioception of scapular, scapulothoracic and UE control with self care, reaching, carrying, lifting, house/yardwork, driving/computer work      Manual Treatments:  PROM / STM / Oscillations-Mobs:  G-I, II, III, IV (PA's, Inf., Post.)  [x] (63463) Provided manual therapy to mobilize soft tissue/joints of cervical/CT, scapular GHJ and UE for the purpose of modulating pain, promoting relaxation,  increasing ROM, reducing/eliminating soft tissue swelling/inflammation/restriction, improving soft tissue extensibility and allowing for proper ROM for normal function with self care, reaching, carrying, lifting, house/yardwork, driving/computer work    Modalities:      Charges:  Timed Code Treatment Minutes: 45   Total Treatment Minutes: 45       [] EVAL (LOW) 79765   [] EVAL (MOD) 69836   [] EVAL (HIGH) 00253   [] RE-EVAL     [x] SO(05534) x   2  [] IONTO  [] NMR (26677) x     [] VASO  [x] Manual (93227) x 1     [] Other:ice  [] TA x      [] Mech Traction (40100)  [] ES(attended) (94548)      [] ES (un) (01185):     GOALS:  Patient stated goal: abolish shoulder pain. Return to work. [] Progressing: [] Met: [] Not Met: [] Adjusted    Therapist goals for Patient:   Short Term Goals: To be achieved in: 2 weeks  1. Independent in HEP and progression per patient tolerance, in order to prevent re-injury. [] Progressing: [] Met: [] Not Met: [] Adjusted  2. Patient will have a decrease in pain to facilitate improvement in movement, function, and ADLs as indicated by Functional Deficits. [] Progressing: [] Met: [] Not Met: [] Adjusted    Long Term Goals: To be achieved in: 12 weeks  1. Disability index score of 25% or less for the [facility] to assist with reaching prior level of function. [x] Progressing: [] Met: [] Not Met: [] Adjusted  2.  Patient will demonstrate increased AROM to 150 flex, 90 ER and 70 IR to allow for proper joint functioning as indicated by patients Functional Deficits. [x] Progressing: [] Met: [] Not Met: [] Adjusted  3. Patient will demonstrate an increase in Strength to 4+/ 5 to allow for proper functional mobility as indicated by patients Functional Deficits. [x] Progressing: [] Met: [] Not Met: [] Adjusted  4. Patient will return to sleeping for 7-8 hours without increased symptoms or restriction. [] Progressing: [x] Met: [] Not Met: [] Adjusted  5. Able dress, drive, and complete light household work without increased symptoms or restrictions. [] Progressing: [x] Met: [] Not Met: [] Adjusted    Progression Towards Functional goals:  [x] Patient is progressing as expected towards functional goals listed. [] Progression is slowed due to complexities listed. [] Progression has been slowed due to co-morbidities. [] Plan just implemented, too soon to assess goals progression  [] Other:     ASSESSMENT:  Patient tolerated treatment well today. Increased volume/intensity with exercises and patient did not have any complaints of pain or discomfort during exercises. Continue to address IR ROM as well as functional strengthening. Overall Progression Towards Functional goals/ Treatment Progress Update:  [x] Patient is progressing as expected towards functional goals listed. [] Progression is slowed due to complexities/Impairments listed. [] Progression has been slowed due to co-morbidities.   [] Plan just implemented, too soon to assess goals progression <30days   [] Goals require adjustment due to lack of progress  [] Patient is not progressing as expected and requires additional follow up with physician  [] Other    Prognosis for POC: [x] Good [] Fair  [] Poor      Patient requires continued skilled intervention: [x] Yes  [] No    Treatment/Activity Tolerance:  [x] Patient able to complete treatment  [] Patient limited by fatigue  [] Patient limited by pain    [] Patient limited by other medical complications  [] Other:          PLAN:   [x] Continue per plan of care [] Alter current plan (see comments above)  [] Plan of care initiated [] Hold pending MD visit [] Discharge      Electronically signed by:  TREVON Mas SPTA. Therapist was present, directed the patient's care, made skilled judgement, and was responsible for assessment and treatment of the patient. Note: If patient does not return for scheduled/ recommended follow up visits, this note will serve as a discharge from care along with most recent update on progress.

## 2021-03-31 ENCOUNTER — HOSPITAL ENCOUNTER (OUTPATIENT)
Dept: PHYSICAL THERAPY | Age: 56
Setting detail: THERAPIES SERIES
Discharge: HOME | End: 2021-03-31
Payer: COMMERCIAL

## 2021-03-31 PROCEDURE — 97140 MANUAL THERAPY 1/> REGIONS: CPT | Performed by: PHYSICAL THERAPIST

## 2021-03-31 PROCEDURE — 97110 THERAPEUTIC EXERCISES: CPT | Performed by: PHYSICAL THERAPIST

## 2021-03-31 NOTE — FLOWSHEET NOTE
(hightest 90 deg) / T-1 AROM   IR   68 deg PROM / T-12 AROM   Abd   165 AROM            Strength       Flex   4+   ABD   4   ER   4   IR    4+                          Pain scale 2-7 / 10 2   Quick Dash 45 = 77% 19  =  18%       RESTRICTIONS/PRECAUTIONS: biceps tenodesis. Pt said Dr is weaning him from sling. Pt understood this still means NO lifting at all with right UE. Exercises/Interventions:     Therapeutic Ex (89360) Sets/sec Reps Notes/CUES         Supine pec s:204x      hep   hep   LLLD ER stretch 20\"  4 x     Serratus punch 10# X 30  hep   Supine R/s  4 directions 10# X 10     Supine punch 10# 3 x 10     Sleeper s : 20  5x  hep   Supine hor abd/    Supine PNF Unable    Landmine Press Bar X 15        Functional lift to middle shelf 47# X 20    Plyo Back- Chest pass / OH pass 4# MB X 20e    Push Up on table   X 15    Chopping/Lifting against wall in mini squat position  4# MB X 10e    Modified plank shoulder taps  X 30     KB walks- Hicks up 90/90  5# NV   Supine Abduction   0# - 1# NV   Bent over row/  Bent over kickback 10# x 30 8# x 30              Ball on wall  X 20     True sTretch   3 ways   Table push ups  X 30  Table    Functional lift    IR s with towel :20 5x hep      Wall walks     Corner s :20  4 x     TB IR/ ER  BL  3 x 10     1/2 kneeling  horiz abd 8# X 15    Biceps s doorway    Manual Intervention (69033)      PROM  /  Mobs/   STM  15 min                            NMR re-education (04603)   CUES NEEDED                                       Therapeutic Activity (50969)                        Access Code: [facility]   URL: [email or web address]   Date: 03/08/2021   Prepared by: Luis Becerra     Exercises   Supine Shoulder Flexion Extension AAROM with Dowel - 10 reps - 1 sets - 5 hold - 1x daily - 7x weekly   Supine Scapular Protraction in Flexion with Dumbbells - 10 reps - 3 sets - 1x daily - 7x weekly   Supine Cross Body Shoulder Stretch - 3 reps - 1 sets - 20 hold - 1x daily - 7x weekly   Sleeper Stretch - 5 reps - 1 sets - 10 hold - 1x daily - 7x weekly   Supine Shoulder Horizontal Abduction with Resistance - 10 reps - 3 sets - 1x daily - 7x weekly   Supine PNF D2 Flexion with Resistance - 10 reps - 3 sets - 1x daily - 7x weekly   Sidelying Shoulder ER with Towel and Dumbbell - 10 reps - 3 sets - 1x daily - 7x weekly   Prone Shoulder Extension - Single Arm - 10 reps - 3 sets - 1x daily - 7x weekly   Shoulder Flexion Wall Walk - 10 reps - 1 sets - 5 hold - 1x daily - 7x weekly   Shoulder Internal Rotation with Resistance - 10 reps - 3 sets - 1x daily - 7x weekly   Shoulder External Rotation with Anchored Resistance - 10 reps - 3 sets - 1x daily - 7x weekly   Standing Shoulder Internal Rotation Stretch with Towel - 5 reps - 1 sets - 10 hold - 1x daily - 7x weekly     Therapeutic Exercise and NMR EXR  [x] (45843) Provided verbal/tactile cueing for activities related to strengthening, flexibility, endurance, ROM  for improvements in scapular, scapulothoracic and UE control with self care, reaching, carrying, lifting, house/yardwork, driving/computer work.    [] (44722) Provided verbal/tactile cueing for activities related to improving balance, coordination, kinesthetic sense, posture, motor skill, proprioception  to assist with  scapular, scapulothoracic and UE control with self care, reaching, carrying, lifting, house/yardwork, driving/computer work. Therapeutic Activities:    [] (52020 or 80110) Provided verbal/tactile cueing for activities related to improving balance, coordination, kinesthetic sense, posture, motor skill, proprioception and motor activation to allow for proper function of scapular, scapulothoracic and UE control with self care, carrying, lifting, driving/computer work.      Home Exercise Program:    [x] (71418) Reviewed/Progressed HEP activities related to strengthening, flexibility, endurance, ROM of scapular, scapulothoracic and UE control with self care, reaching, carrying, lifting, house/yardwork, driving/computer work  [] (99125) Reviewed/Progressed HEP activities related to improving balance, coordination, kinesthetic sense, posture, motor skill, proprioception of scapular, scapulothoracic and UE control with self care, reaching, carrying, lifting, house/yardwork, driving/computer work      Manual Treatments:  PROM / STM / Oscillations-Mobs:  G-I, II, III, IV (PA's, Inf., Post.)  [x] (24507) Provided manual therapy to mobilize soft tissue/joints of cervical/CT, scapular GHJ and UE for the purpose of modulating pain, promoting relaxation,  increasing ROM, reducing/eliminating soft tissue swelling/inflammation/restriction, improving soft tissue extensibility and allowing for proper ROM for normal function with self care, reaching, carrying, lifting, house/yardwork, driving/computer work    Modalities:      Charges:  Timed Code Treatment Minutes: 45   Total Treatment Minutes: 45       [] EVAL (LOW) 03734   [] EVAL (MOD) 83614   [] EVAL (HIGH) 10076   [] RE-EVAL     [x] TY(08263) x   2  [] IONTO  [] NMR (64352) x     [] VASO  [x] Manual (72467) x 1     [] Other:ice  [] TA x      [] Mech Traction (21255)  [] ES(attended) (59443)      [] ES (un) (24991):     GOALS:  Patient stated goal: abolish shoulder pain. Return to work. [] Progressing: [] Met: [] Not Met: [] Adjusted    Therapist goals for Patient:   Short Term Goals: To be achieved in: 2 weeks  1. Independent in HEP and progression per patient tolerance, in order to prevent re-injury. [] Progressing: [] Met: [] Not Met: [] Adjusted  2. Patient will have a decrease in pain to facilitate improvement in movement, function, and ADLs as indicated by Functional Deficits. [] Progressing: [] Met: [] Not Met: [] Adjusted    Long Term Goals: To be achieved in: 12 weeks  1. Disability index score of 25% or less for the [facility] to assist with reaching prior level of function.    [x] Progressing: [] Met: [] Not Met: [] Adjusted  2. Patient will demonstrate increased AROM to 150 flex, 90 ER and 70 IR to allow for proper joint functioning as indicated by patients Functional Deficits. [x] Progressing: [] Met: [] Not Met: [] Adjusted  3. Patient will demonstrate an increase in Strength to 4+/ 5 to allow for proper functional mobility as indicated by patients Functional Deficits. [x] Progressing: [] Met: [] Not Met: [] Adjusted  4. Patient will return to sleeping for 7-8 hours without increased symptoms or restriction. [] Progressing: [x] Met: [] Not Met: [] Adjusted  5. Able dress, drive, and complete light household work without increased symptoms or restrictions. [] Progressing: [x] Met: [] Not Met: [] Adjusted    Progression Towards Functional goals:  [x] Patient is progressing as expected towards functional goals listed. [] Progression is slowed due to complexities listed. [] Progression has been slowed due to co-morbidities. [] Plan just implemented, too soon to assess goals progression  [] Other:     ASSESSMENT:  Patient tolerated treatment well today. Increased weight with some exercises and patient was able to do so with no pain. Continue to address ROM and strength deficits. Overall Progression Towards Functional goals/ Treatment Progress Update:  [x] Patient is progressing as expected towards functional goals listed. [] Progression is slowed due to complexities/Impairments listed. [] Progression has been slowed due to co-morbidities.   [] Plan just implemented, too soon to assess goals progression <30days   [] Goals require adjustment due to lack of progress  [] Patient is not progressing as expected and requires additional follow up with physician  [] Other    Prognosis for POC: [x] Good [] Fair  [] Poor      Patient requires continued skilled intervention: [x] Yes  [] No    Treatment/Activity Tolerance:  [x] Patient able to complete treatment  [] Patient limited by fatigue  [] Patient limited by pain [] Patient limited by other medical complications  [] Other:          PLAN:   [x] Continue per plan of care [] Alter current plan (see comments above)  [] Plan of care initiated [] Hold pending MD visit [] Discharge      Electronically signed by:  TREVON Guzman SPTA. Therapist was present, directed the patient's care, made skilled judgement, and was responsible for assessment and treatment of the patient. Note: If patient does not return for scheduled/ recommended follow up visits, this note will serve as a discharge from care along with most recent update on progress.

## 2021-04-05 ENCOUNTER — HOSPITAL ENCOUNTER (OUTPATIENT)
Dept: PHYSICAL THERAPY | Age: 56
Setting detail: THERAPIES SERIES
Discharge: HOME | End: 2021-04-05
Payer: COMMERCIAL

## 2021-04-05 PROCEDURE — 97140 MANUAL THERAPY 1/> REGIONS: CPT | Performed by: PHYSICAL THERAPIST

## 2021-04-05 PROCEDURE — 97110 THERAPEUTIC EXERCISES: CPT | Performed by: PHYSICAL THERAPIST

## 2021-04-05 NOTE — FLOWSHEET NOTE
[facility], [address] [place]  Phone: [phone]  Fax [phone]        Date:  2021    Patient Name:  Chadwick Daniel    :  1965  MRN: [id number]  Restrictions/Precautions:    Medical/Treatment Diagnosis Information:  · Diagnosis: right shoulder pain  M25.511    s/p SAD with open biceps tenodesis   DOS:  12/15/20  · Treatment Diagnosis: right shoulder pain  F49.260  Insurance/Certification information:  PT Insurance Information: [facility] -$1500 deductible. 40 PT allowed, no auth. Physician Information:  Referring Practitioner: Dr. Sugar Wilkinson  Has the plan of care been signed (Y/N):        []  Yes  [x]  No     Date of Patient follow up with Physician: 20      Is this a Progress Report:     []  Yes  [x]  No        If Yes:  Date Range for reporting period:  Beginnin20  Ending:  3/8/21    Progress report will be due (10 Rx or 30 days whichever is less):        Recertification will be due (POC Duration  / 90 days whichever is less):       Visit # Insurance Allowable Auth Required   In-person 15 40 []  Yes [x]  No    Telehealth   []  Yes []  No    Total          Therapy Diagnosis/Practice Pattern:I       Number of Comorbidities:  [x]0     []1-2    []3+    Latex Allergy:  [x]NO      []YES  Preferred Language for Healthcare:   [x]English       []other:    SUBJECTIVE:  Patient reports his shoulder still gets sore. Some shoulder pain with muscle activation. Patient reports he is trying to push going back to work a week to make sure he is work-ready.      OBJECTIVE:    Observation:   ROM Initial (R) Current (R)   PROM flexion 60 155 PROM/ 148 AROM   ER 10 82 PROM (hightest 90 deg) / T-1 AROM   IR   68 deg PROM / T-12 AROM   Abd   165 AROM            Strength       Flex   4+   ABD   4   ER   4   IR    4+                          Pain scale 2-7 / 10 2   Quick Dash 45 = 77% 19  =  18% RESTRICTIONS/PRECAUTIONS: biceps tenodesis. Pt said Dr is weaning him from sling. Pt understood this still means NO lifting at all with right UE. Exercises/Interventions:     Therapeutic Ex (75627) Sets/sec Reps Notes/CUES         Supine pec s:20 4x       hep   hep   LLLD ER stretch 20\"  4 x     Serratus punch 10# X 30  hep   Supine R/s  4 directions 10# X 10     Supine punch 10# 3 x 10     Sleeper s : 20  5x  hep   Supine hor abd/    Supine PNF Unable    Landmine Press Bar X 15        Functional lift to middle shelf 47# X 20    Plyo Back- Chest pass / OH pass 4# MB X 20e    Push Up on table   X 15    Chopping/Lifting against wall in mini squat position  4# MB X 10e    Modified plank shoulder taps  X 30     Plate walks- Bell up 90/90 5# 2 trips     Cane stretch  :20 4x    Bent over row/  Bent over kickback 10# x 30 8# x 30     Supine abduction  2# X 20     TB horizontal adduction GR 2 x 10     Ball on wall  X 20 e    True sTretch   3 ways         Functional lift    IR s with towel :20 5x hep      Wall walks     Corner s :20  4 x     TB IR/ ER  BL  3 x 10     1/2 kneeling  horiz abd    Biceps s doorway    Manual Intervention (65049)      PROM  /  Mobs/   STM  10 min                            NMR re-education (77220)   CUES NEEDED                                       Therapeutic Activity (53209)                        Access Code: [facility]   URL: [email or web address]   Date: 03/08/2021   Prepared by: Kat Treviño     Exercises   Supine Shoulder Flexion Extension AAROM with Dowel - 10 reps - 1 sets - 5 hold - 1x daily - 7x weekly   Supine Scapular Protraction in Flexion with Dumbbells - 10 reps - 3 sets - 1x daily - 7x weekly   Supine Cross Body Shoulder Stretch - 3 reps - 1 sets - 20 hold - 1x daily - 7x weekly   Sleeper Stretch - 5 reps - 1 sets - 10 hold - 1x daily - 7x weekly   Supine Shoulder Horizontal Abduction with Resistance - 10 reps - 3 sets - 1x daily - 7x weekly   Supine PNF D2 Flexion with Resistance - 10 reps - 3 sets - 1x daily - 7x weekly   Sidelying Shoulder ER with Towel and Dumbbell - 10 reps - 3 sets - 1x daily - 7x weekly   Prone Shoulder Extension - Single Arm - 10 reps - 3 sets - 1x daily - 7x weekly   Shoulder Flexion Wall Walk - 10 reps - 1 sets - 5 hold - 1x daily - 7x weekly   Shoulder Internal Rotation with Resistance - 10 reps - 3 sets - 1x daily - 7x weekly   Shoulder External Rotation with Anchored Resistance - 10 reps - 3 sets - 1x daily - 7x weekly   Standing Shoulder Internal Rotation Stretch with Towel - 5 reps - 1 sets - 10 hold - 1x daily - 7x weekly     Therapeutic Exercise and NMR EXR  [x] (11391) Provided verbal/tactile cueing for activities related to strengthening, flexibility, endurance, ROM  for improvements in scapular, scapulothoracic and UE control with self care, reaching, carrying, lifting, house/yardwork, driving/computer work.    [] (32284) Provided verbal/tactile cueing for activities related to improving balance, coordination, kinesthetic sense, posture, motor skill, proprioception  to assist with  scapular, scapulothoracic and UE control with self care, reaching, carrying, lifting, house/yardwork, driving/computer work. Therapeutic Activities:    [] (17931 or 69696) Provided verbal/tactile cueing for activities related to improving balance, coordination, kinesthetic sense, posture, motor skill, proprioception and motor activation to allow for proper function of scapular, scapulothoracic and UE control with self care, carrying, lifting, driving/computer work.      Home Exercise Program:    [x] (74467) Reviewed/Progressed HEP activities related to strengthening, flexibility, endurance, ROM of scapular, scapulothoracic and UE control with self care, reaching, carrying, lifting, house/yardwork, driving/computer work  [] (48594) Reviewed/Progressed HEP activities related to improving balance, coordination, kinesthetic sense, posture, motor skill, proprioception of scapular, scapulothoracic and UE control with self care, reaching, carrying, lifting, house/yardwork, driving/computer work      Manual Treatments:  PROM / STM / Oscillations-Mobs:  G-I, II, III, IV (Natalee, Inf., Post.)  [x] (78512) Provided manual therapy to mobilize soft tissue/joints of cervical/CT, scapular GHJ and UE for the purpose of modulating pain, promoting relaxation,  increasing ROM, reducing/eliminating soft tissue swelling/inflammation/restriction, improving soft tissue extensibility and allowing for proper ROM for normal function with self care, reaching, carrying, lifting, house/yardwork, driving/computer work    Modalities:      Charges:  Timed Code Treatment Minutes: 45   Total Treatment Minutes: 45       [] EVAL (LOW) 12515   [] EVAL (MOD) 60719   [] EVAL (HIGH) 62515   [] RE-EVAL     [x] UM(68550) x   2  [] IONTO  [] NMR (47674) x     [] VASO  [x] Manual (46699) x 1     [] Other:ice  [] TA x      [] Mech Traction (45592)  [] ES(attended) (23484)      [] ES (un) (24771):     GOALS:  Patient stated goal: abolish shoulder pain. Return to work. [] Progressing: [] Met: [] Not Met: [] Adjusted    Therapist goals for Patient:   Short Term Goals: To be achieved in: 2 weeks  1. Independent in HEP and progression per patient tolerance, in order to prevent re-injury. [] Progressing: [] Met: [] Not Met: [] Adjusted  2. Patient will have a decrease in pain to facilitate improvement in movement, function, and ADLs as indicated by Functional Deficits. [] Progressing: [] Met: [] Not Met: [] Adjusted    Long Term Goals: To be achieved in: 12 weeks  1. Disability index score of 25% or less for the [facility] to assist with reaching prior level of function. [x] Progressing: [] Met: [] Not Met: [] Adjusted  2. Patient will demonstrate increased AROM to 150 flex, 90 ER and 70 IR to allow for proper joint functioning as indicated by patients Functional Deficits.    [x] Progressing: [] Met: [] Not Met: [] Adjusted  3. Patient will demonstrate an increase in Strength to 4+/ 5 to allow for proper functional mobility as indicated by patients Functional Deficits. [x] Progressing: [] Met: [] Not Met: [] Adjusted  4. Patient will return to sleeping for 7-8 hours without increased symptoms or restriction. [] Progressing: [x] Met: [] Not Met: [] Adjusted  5. Able dress, drive, and complete light household work without increased symptoms or restrictions. [] Progressing: [x] Met: [] Not Met: [] Adjusted    Progression Towards Functional goals:  [x] Patient is progressing as expected towards functional goals listed. [] Progression is slowed due to complexities listed. [] Progression has been slowed due to co-morbidities. [] Plan just implemented, too soon to assess goals progression  [] Other:     ASSESSMENT:  Patient tolerated additions in treatment today well. Shoulder was fatigued following treatment. No complaints of pain or discomfort during new exercises. Overall Progression Towards Functional goals/ Treatment Progress Update:  [x] Patient is progressing as expected towards functional goals listed. [] Progression is slowed due to complexities/Impairments listed. [] Progression has been slowed due to co-morbidities.   [] Plan just implemented, too soon to assess goals progression <30days   [] Goals require adjustment due to lack of progress  [] Patient is not progressing as expected and requires additional follow up with physician  [] Other    Prognosis for POC: [x] Good [] Fair  [] Poor      Patient requires continued skilled intervention: [x] Yes  [] No    Treatment/Activity Tolerance:  [x] Patient able to complete treatment  [] Patient limited by fatigue  [] Patient limited by pain    [] Patient limited by other medical complications  [] Other:          PLAN:   [x] Continue per plan of care [] Alter current plan (see comments above)  [] Plan of care initiated [] Hold pending MD visit [] Discharge      Electronically signed by:  Flakito Winchester, REG Tavarez. Therapist was present, directed the patient's care, made skilled judgement, and was responsible for assessment and treatment of the patient. Note: If patient does not return for scheduled/ recommended follow up visits, this note will serve as a discharge from care along with most recent update on progress.

## 2021-04-08 ENCOUNTER — HOSPITAL ENCOUNTER (OUTPATIENT)
Dept: PHYSICAL THERAPY | Age: 56
Setting detail: THERAPIES SERIES
Discharge: HOME | End: 2021-04-08
Payer: COMMERCIAL

## 2021-04-08 PROCEDURE — 97140 MANUAL THERAPY 1/> REGIONS: CPT | Performed by: PHYSICAL THERAPIST

## 2021-04-08 PROCEDURE — 97110 THERAPEUTIC EXERCISES: CPT | Performed by: PHYSICAL THERAPIST

## 2021-04-08 NOTE — FLOWSHEET NOTE
[facility],  [address] [place]  Phone: [phone]  Fax [phone]        Date:  2021    Patient Name:  Manuel Briggs    :  1965  MRN: [id number]  Restrictions/Precautions:    Medical/Treatment Diagnosis Information:  · Diagnosis: right shoulder pain  M25.511    s/p SAD with open biceps tenodesis   DOS:  12/15/20  · Treatment Diagnosis: right shoulder pain  R74.378  Insurance/Certification information:  PT Insurance Information: [facility] -$1500 deductible. 40 PT allowed, no auth. Physician Information:  Referring Practitioner: Dr. Nkechi Mccormick  Has the plan of care been signed (Y/N):        []  Yes  [x]  No     Date of Patient follow up with Physician: 20      Is this a Progress Report:     []  Yes  [x]  No        If Yes:  Date Range for reporting period:  Beginnin20  Ending:  3/8/21    Progress report will be due (10 Rx or 30 days whichever is less):        Recertification will be due (POC Duration  / 90 days whichever is less):       Visit # Insurance Allowable Auth Required   In-person 16 40 []  Yes [x]  No    Telehealth   []  Yes []  No    Total          Therapy Diagnosis/Practice Pattern:I       Number of Comorbidities:  [x]0     []1-2    []3+    Latex Allergy:  [x]NO      []YES  Preferred Language for Healthcare:   [x]English       []other:    SUBJECTIVE:  Patient reports shoulder is feeling stiff and sore today due to sleeping on that shoulder. Patient reports his shoulder feels good for the most part, just depends on the day and what he is doing. Patient goes to see MD tomorrow.      OBJECTIVE:    Observation:   ROM Initial (R) Current (R)   PROM flexion 60 155 PROM/ 148 AROM   ER 10 82 PROM (hightest 90 deg) / T-1 AROM   IR   68 deg PROM / T-12 AROM   Abd   165 AROM            Strength       Flex   4+   ABD   4   ER   4   IR    4+                          Pain scale 2-7 / 10 2 Quick Dash 45 = 77% 19  =  18%       RESTRICTIONS/PRECAUTIONS: biceps tenodesis. Pt said Dr is weaning him from sling. Pt understood this still means NO lifting at all with right UE. Exercises/Interventions:     Therapeutic Ex (75234) Sets/sec Reps Notes/CUES   Supine across the body scap stretch  :20  3x    Supine pec s      hep   hep   LLLD ER stretch 20\"  3 x     Serratus punch 10# X 30  hep   Supine R/s  4 directions 10# X 10     Supine punch 10# 3 x 10     Sleeper s : 20  5x  hep   Supine hor abd/    Supine PNF Unable    Landmine Press Bar X 15        Functional lift to middle shelf 47# X 20 Up # NV    Plyo Back- Chest pass / OH pass 4# MB X 20e    Push Up on table   2 x 10     Chopping/Lifting against wall in mini squat position  4# MB X 10e    Modified plank shoulder taps  X 30     Plate walks- Bell up 90/90 5# 2 trips     Cane stretch  :20 4x    Bent over row/  Bent over kickback 10# x 30 8# x 30  15# KB for row NV    Supine abduction     TB horizontal adduction GR 2 x 10     Ball on wall  X 20 e    True sTretch  X 10          Functional lift    IR s with towel hep      Wall walks     Corner s :20  4 x     TB IR/ ER  BL  3 x 10     1/2 kneeling  horiz abd    Biceps s doorway    Manual Intervention (27366)      PROM  /  Mobs/   STM  10 min                            NMR re-education (85815)   CUES NEEDED                                       Therapeutic Activity (65799)                        Access Code: [facility]   URL: [email or web address]   Date: 03/08/2021   Prepared by: Dami Bowles     Exercises   Supine Shoulder Flexion Extension AAROM with Dowel - 10 reps - 1 sets - 5 hold - 1x daily - 7x weekly   Supine Scapular Protraction in Flexion with Dumbbells - 10 reps - 3 sets - 1x daily - 7x weekly   Supine Cross Body Shoulder Stretch - 3 reps - 1 sets - 20 hold - 1x daily - 7x weekly   Sleeper Stretch - 5 reps - 1 sets - 10 hold - 1x daily - 7x weekly   Supine Shoulder Horizontal Abduction with Resistance - 10 reps - 3 sets - 1x daily - 7x weekly   Supine PNF D2 Flexion with Resistance - 10 reps - 3 sets - 1x daily - 7x weekly   Sidelying Shoulder ER with Towel and Dumbbell - 10 reps - 3 sets - 1x daily - 7x weekly   Prone Shoulder Extension - Single Arm - 10 reps - 3 sets - 1x daily - 7x weekly   Shoulder Flexion Wall Walk - 10 reps - 1 sets - 5 hold - 1x daily - 7x weekly   Shoulder Internal Rotation with Resistance - 10 reps - 3 sets - 1x daily - 7x weekly   Shoulder External Rotation with Anchored Resistance - 10 reps - 3 sets - 1x daily - 7x weekly   Standing Shoulder Internal Rotation Stretch with Towel - 5 reps - 1 sets - 10 hold - 1x daily - 7x weekly     Therapeutic Exercise and NMR EXR  [x] (05631) Provided verbal/tactile cueing for activities related to strengthening, flexibility, endurance, ROM  for improvements in scapular, scapulothoracic and UE control with self care, reaching, carrying, lifting, house/yardwork, driving/computer work.    [] (09745) Provided verbal/tactile cueing for activities related to improving balance, coordination, kinesthetic sense, posture, motor skill, proprioception  to assist with  scapular, scapulothoracic and UE control with self care, reaching, carrying, lifting, house/yardwork, driving/computer work. Therapeutic Activities:    [] (45668 or 69269) Provided verbal/tactile cueing for activities related to improving balance, coordination, kinesthetic sense, posture, motor skill, proprioception and motor activation to allow for proper function of scapular, scapulothoracic and UE control with self care, carrying, lifting, driving/computer work.      Home Exercise Program:    [x] (40463) Reviewed/Progressed HEP activities related to strengthening, flexibility, endurance, ROM of scapular, scapulothoracic and UE control with self care, reaching, carrying, lifting, house/yardwork, driving/computer work  [] (78765) Reviewed/Progressed HEP activities related to improving balance, coordination, kinesthetic sense, posture, motor skill, proprioception of scapular, scapulothoracic and UE control with self care, reaching, carrying, lifting, house/yardwork, driving/computer work      Manual Treatments:  PROM / STM / Oscillations-Mobs:  G-I, II, III, IV (PA's, Inf., Post.)  [x] (65209) Provided manual therapy to mobilize soft tissue/joints of cervical/CT, scapular GHJ and UE for the purpose of modulating pain, promoting relaxation,  increasing ROM, reducing/eliminating soft tissue swelling/inflammation/restriction, improving soft tissue extensibility and allowing for proper ROM for normal function with self care, reaching, carrying, lifting, house/yardwork, driving/computer work    Modalities:      Charges:  Timed Code Treatment Minutes: 45   Total Treatment Minutes: 45       [] EVAL (LOW) 44662   [] EVAL (MOD) 02937   [] EVAL (HIGH) 70886   [] RE-EVAL     [x] ZK(04044) x   2  [] IONTO  [] NMR (98610) x     [] VASO  [x] Manual (58124) x 1     [] Other:ice  [] TA x      [] Mech Traction (83787)  [] ES(attended) (98710)      [] ES (un) (82551):     GOALS:  Patient stated goal: abolish shoulder pain. Return to work. [] Progressing: [] Met: [] Not Met: [] Adjusted    Therapist goals for Patient:   Short Term Goals: To be achieved in: 2 weeks  1. Independent in HEP and progression per patient tolerance, in order to prevent re-injury. [] Progressing: [] Met: [] Not Met: [] Adjusted  2. Patient will have a decrease in pain to facilitate improvement in movement, function, and ADLs as indicated by Functional Deficits. [] Progressing: [] Met: [] Not Met: [] Adjusted    Long Term Goals: To be achieved in: 12 weeks  1. Disability index score of 25% or less for the [facility] to assist with reaching prior level of function. [x] Progressing: [] Met: [] Not Met: [] Adjusted  2.  Patient will demonstrate increased AROM to 150 flex, 90 ER and 70 IR to allow for proper joint functioning as indicated by patients Functional Deficits. [x] Progressing: [] Met: [] Not Met: [] Adjusted  3. Patient will demonstrate an increase in Strength to 4+/ 5 to allow for proper functional mobility as indicated by patients Functional Deficits. [x] Progressing: [] Met: [] Not Met: [] Adjusted  4. Patient will return to sleeping for 7-8 hours without increased symptoms or restriction. [] Progressing: [x] Met: [] Not Met: [] Adjusted  5. Able dress, drive, and complete light household work without increased symptoms or restrictions. [] Progressing: [x] Met: [] Not Met: [] Adjusted    Progression Towards Functional goals:  [x] Patient is progressing as expected towards functional goals listed. [] Progression is slowed due to complexities listed. [] Progression has been slowed due to co-morbidities. [] Plan just implemented, too soon to assess goals progression  [] Other:     ASSESSMENT: Patient tolerated treatment well today. No complaints of pain during exercises. Patient fatigued easily during shoulder taps today. Next visit address more overhead lifting as well as adduction motions. Overall Progression Towards Functional goals/ Treatment Progress Update:  [x] Patient is progressing as expected towards functional goals listed. [] Progression is slowed due to complexities/Impairments listed. [] Progression has been slowed due to co-morbidities.   [] Plan just implemented, too soon to assess goals progression <30days   [] Goals require adjustment due to lack of progress  [] Patient is not progressing as expected and requires additional follow up with physician  [] Other    Prognosis for POC: [x] Good [] Fair  [] Poor      Patient requires continued skilled intervention: [x] Yes  [] No    Treatment/Activity Tolerance:  [x] Patient able to complete treatment  [] Patient limited by fatigue  [] Patient limited by pain    [] Patient limited by other medical complications  [] Other: PLAN:   [x] Continue per plan of care [] Alter current plan (see comments above)  [] Plan of care initiated [] Hold pending MD visit [] Discharge      Electronically signed by:  TREVON Guzman SPTA. Therapist was present, directed the patient's care, made skilled judgement, and was responsible for assessment and treatment of the patient. Note: If patient does not return for scheduled/ recommended follow up visits, this note will serve as a discharge from care along with most recent update on progress.

## 2021-04-12 ENCOUNTER — HOSPITAL ENCOUNTER (OUTPATIENT)
Dept: PHYSICAL THERAPY | Age: 56
Setting detail: THERAPIES SERIES
Discharge: HOME | End: 2021-04-12
Payer: COMMERCIAL

## 2021-04-12 PROCEDURE — 97530 THERAPEUTIC ACTIVITIES: CPT | Performed by: PHYSICAL THERAPIST

## 2021-04-12 PROCEDURE — 97140 MANUAL THERAPY 1/> REGIONS: CPT | Performed by: PHYSICAL THERAPIST

## 2021-04-12 NOTE — FLOWSHEET NOTE
[facility],  [address] [place]  Phone: [phone]  Fax [phone]        Date:  2021    Patient Name:  Joe Duque    :  1965  MRN: [id number]  Restrictions/Precautions:    Medical/Treatment Diagnosis Information:  · Diagnosis: right shoulder pain  M25.511    s/p SAD with open biceps tenodesis   DOS:  12/15/20  · Treatment Diagnosis: right shoulder pain  S68.138  Insurance/Certification information:  PT Insurance Information: [facility] -$1500 deductible. 40 PT allowed, no auth. Physician Information:  Referring Practitioner: Dr. Pierre Ket  Has the plan of care been signed (Y/N):        []  Yes  [x]  No     Date of Patient follow up with Physician: 20      Is this a Progress Report:     []  Yes  [x]  No        If Yes:  Date Range for reporting period:  Beginnin20  Ending:  3/8/21    Progress report will be due (10 Rx or 30 days whichever is less):        Recertification will be due (POC Duration  / 90 days whichever is less):       Visit # Insurance Allowable Auth Required   In-person 17 40 []  Yes [x]  No    Telehealth   []  Yes []  No    Total          Therapy Diagnosis/Practice Pattern:I       Number of Comorbidities:  [x]0     []1-2    []3+    Latex Allergy:  [x]NO      []YES  Preferred Language for Healthcare:   [x]English       []other:    SUBJECTIVE:  Patient saw MD last week and reports he will go back to work 21. Patient reports shoulder has just been more stiff than usual lately. OBJECTIVE:    Observation:   ROM Initial (R) Current (R)   PROM flexion 60 155 PROM/ 148 AROM   ER 10 82 PROM (hightest 90 deg) / T-1 AROM   IR   68 deg PROM / T-12 AROM   Abd   165 AROM            Strength       Flex   4+   ABD   4   ER   4   IR    4+                          Pain scale 2-7 / 10 2   Quick Dash 45 = 77% 19  =  18%       RESTRICTIONS/PRECAUTIONS: biceps tenodesis.    Pt said  is weaning him from sling. Pt understood this still means NO lifting at all with right UE. Exercises/Interventions:     Therapeutic Ex (63844) Sets/sec Reps Notes/CUES   Supine across the body scap stretch     Supine pec s:20 4x       hep   hep   LLLD ER stretch 20\"  3 x     Serratus punch 10# X 30  hep   Supine R/s  4 directions 10# X 10     Supine punch 10# 3 x 10     Sleeper s : 20  5x  hep   Supine hor abd/    Supine PNF Unable    Landmine Press Bar X 15        Functional lift to middle shelf 47# X 20    Plyo Back- Chest pass / OH pass    Push Up on table   2 x 10     Chopping/Lifting against wall in mini squat position  4# MB X 10e    Modified plank shoulder taps  X 30     Plate walks- Bell up 90/90 5# 2 trips     Cane stretch  :20 4x    Bent over row/  Bent over kickback 15# x 20 8# x 20     Supine abduction     TB horizontal adduction GR 2 x 10     Ball on wall  True sTretch  X 10  2 ways        Functional lift    IR s with towel hep      Wall walks     Corner s :20  4 x     TB IR/ ER  BL  3 x 10     1/2 kneeling  horiz abd    Biceps s doorway :20 3x     Manual Intervention (51034)      PROM  /  Mobs/   STM  10 min                            NMR re-education (65741)   CUES NEEDED                                       Therapeutic Activity (62898)                        Access Code: Psychiatric   URL: [email or web address]   Date: 03/08/2021   Prepared by: Monalisa Crystal     Exercises   Supine Shoulder Flexion Extension AAROM with Dowel - 10 reps - 1 sets - 5 hold - 1x daily - 7x weekly   Supine Scapular Protraction in Flexion with Dumbbells - 10 reps - 3 sets - 1x daily - 7x weekly   Supine Cross Body Shoulder Stretch - 3 reps - 1 sets - 20 hold - 1x daily - 7x weekly   Sleeper Stretch - 5 reps - 1 sets - 10 hold - 1x daily - 7x weekly   Supine Shoulder Horizontal Abduction with Resistance - 10 reps - 3 sets - 1x daily - 7x weekly   Supine PNF D2 Flexion with Resistance - 10 reps - 3 sets - 1x daily - 7x weekly   Sidelying Shoulder ER with Towel and Dumbbell - 10 reps - 3 sets - 1x daily - 7x weekly   Prone Shoulder Extension - Single Arm - 10 reps - 3 sets - 1x daily - 7x weekly   Shoulder Flexion Wall Walk - 10 reps - 1 sets - 5 hold - 1x daily - 7x weekly   Shoulder Internal Rotation with Resistance - 10 reps - 3 sets - 1x daily - 7x weekly   Shoulder External Rotation with Anchored Resistance - 10 reps - 3 sets - 1x daily - 7x weekly   Standing Shoulder Internal Rotation Stretch with Towel - 5 reps - 1 sets - 10 hold - 1x daily - 7x weekly     Therapeutic Exercise and NMR EXR  [x] (36993) Provided verbal/tactile cueing for activities related to strengthening, flexibility, endurance, ROM  for improvements in scapular, scapulothoracic and UE control with self care, reaching, carrying, lifting, house/yardwork, driving/computer work.    [] (35061) Provided verbal/tactile cueing for activities related to improving balance, coordination, kinesthetic sense, posture, motor skill, proprioception  to assist with  scapular, scapulothoracic and UE control with self care, reaching, carrying, lifting, house/yardwork, driving/computer work. Therapeutic Activities:    [] (91125 or 92224) Provided verbal/tactile cueing for activities related to improving balance, coordination, kinesthetic sense, posture, motor skill, proprioception and motor activation to allow for proper function of scapular, scapulothoracic and UE control with self care, carrying, lifting, driving/computer work.      Home Exercise Program:    [x] (71527) Reviewed/Progressed HEP activities related to strengthening, flexibility, endurance, ROM of scapular, scapulothoracic and UE control with self care, reaching, carrying, lifting, house/yardwork, driving/computer work  [] (80835) Reviewed/Progressed HEP activities related to improving balance, coordination, kinesthetic sense, posture, motor skill, proprioception of scapular, scapulothoracic and UE control with self care, reaching, carrying, lifting, house/yardwork, driving/computer work      Manual Treatments:  PROM / STM / Oscillations-Mobs:  G-I, II, III, IV (Natalee, Inf., Post.)  [x] (41034) Provided manual therapy to mobilize soft tissue/joints of cervical/CT, scapular GHJ and UE for the purpose of modulating pain, promoting relaxation,  increasing ROM, reducing/eliminating soft tissue swelling/inflammation/restriction, improving soft tissue extensibility and allowing for proper ROM for normal function with self care, reaching, carrying, lifting, house/yardwork, driving/computer work    Modalities:      Charges:  Timed Code Treatment Minutes: 45   Total Treatment Minutes: 45       [] EVAL (LOW) 99032   [] EVAL (MOD) 91634   [] EVAL (HIGH) 37285   [] RE-EVAL     [x] OY(68666) x   2  [] IONTO  [] NMR (36301) x     [] VASO  [x] Manual (77470) x 1     [] Other:ice  [] TA x      [] Mech Traction (09037)  [] ES(attended) (56621)      [] ES (un) (39618):     GOALS:  Patient stated goal: abolish shoulder pain. Return to work. [] Progressing: [] Met: [] Not Met: [] Adjusted    Therapist goals for Patient:   Short Term Goals: To be achieved in: 2 weeks  1. Independent in HEP and progression per patient tolerance, in order to prevent re-injury. [] Progressing: [] Met: [] Not Met: [] Adjusted  2. Patient will have a decrease in pain to facilitate improvement in movement, function, and ADLs as indicated by Functional Deficits. [] Progressing: [] Met: [] Not Met: [] Adjusted    Long Term Goals: To be achieved in: 12 weeks  1. Disability index score of 25% or less for the [facility] to assist with reaching prior level of function. [x] Progressing: [] Met: [] Not Met: [] Adjusted  2. Patient will demonstrate increased AROM to 150 flex, 90 ER and 70 IR to allow for proper joint functioning as indicated by patients Functional Deficits. [x] Progressing: [] Met: [] Not Met: [] Adjusted  3.  Patient will demonstrate an increase in Strength to 4+/ 5 to allow for proper functional mobility as indicated by patients Functional Deficits. [x] Progressing: [] Met: [] Not Met: [] Adjusted  4. Patient will return to sleeping for 7-8 hours without increased symptoms or restriction. [] Progressing: [x] Met: [] Not Met: [] Adjusted  5. Able dress, drive, and complete light household work without increased symptoms or restrictions. [] Progressing: [x] Met: [] Not Met: [] Adjusted    Progression Towards Functional goals:  [x] Patient is progressing as expected towards functional goals listed. [] Progression is slowed due to complexities listed. [] Progression has been slowed due to co-morbidities. [] Plan just implemented, too soon to assess goals progression  [] Other:     ASSESSMENT: Patient tolerated treatment well today. No complaints of pain. Patients shoulder fatigues easily toward the end of session. Educated patient on how important stretching at home at least twice a day to help decrease stiffness. Overall Progression Towards Functional goals/ Treatment Progress Update:  [x] Patient is progressing as expected towards functional goals listed. [] Progression is slowed due to complexities/Impairments listed. [] Progression has been slowed due to co-morbidities.   [] Plan just implemented, too soon to assess goals progression <30days   [] Goals require adjustment due to lack of progress  [] Patient is not progressing as expected and requires additional follow up with physician  [] Other    Prognosis for POC: [x] Good [] Fair  [] Poor      Patient requires continued skilled intervention: [x] Yes  [] No    Treatment/Activity Tolerance:  [x] Patient able to complete treatment  [] Patient limited by fatigue  [] Patient limited by pain    [] Patient limited by other medical complications  [] Other:          PLAN:   [x] Continue per plan of care [] Alter current plan (see comments above)  [] Plan of care initiated [] Hold pending MD visit [] Discharge      Electronically signed by:  Jerrod Casey, PT   [facility]. Therapist was present, directed the patient's care, made skilled judgement, and was responsible for assessment and treatment of the patient. Note: If patient does not return for scheduled/ recommended follow up visits, this note will serve as a discharge from care along with most recent update on progress.

## 2021-04-15 ENCOUNTER — HOSPITAL ENCOUNTER (OUTPATIENT)
Dept: PHYSICAL THERAPY | Age: 56
Setting detail: THERAPIES SERIES
Discharge: HOME | End: 2021-04-15
Payer: COMMERCIAL

## 2021-04-15 PROCEDURE — 97110 THERAPEUTIC EXERCISES: CPT | Performed by: PHYSICAL THERAPIST

## 2021-04-15 PROCEDURE — 97140 MANUAL THERAPY 1/> REGIONS: CPT | Performed by: PHYSICAL THERAPIST

## 2021-04-15 NOTE — FLOWSHEET NOTE
[facility],  [address]  Phone: [phone]  Fax [phone]        Date:  4/15/2021    Patient Name:  Chad Frazier    :  1965  MRN: [id number]  Restrictions/Precautions:    Medical/Treatment Diagnosis Information:  · Diagnosis: right shoulder pain  M25.511    s/p SAD with open biceps tenodesis   DOS:  12/15/20  · Treatment Diagnosis: right shoulder pain  E10.049  Insurance/Certification information:  PT Insurance Information: [facility] -$1500 deductible. 40 PT allowed, no auth. Physician Information:  Referring Practitioner: Dr. Vandana Cardona  Has the plan of care been signed (Y/N):        []  Yes  [x]  No     Date of Patient follow up with Physician: 20      Is this a Progress Report:     []  Yes  [x]  No        If Yes:  Date Range for reporting period:  Beginnin20  Ending:  3/8/21    Progress report will be due (10 Rx or 30 days whichever is less):        Recertification will be due (POC Duration  / 90 days whichever is less):       Visit # Insurance Allowable Auth Required   In-person 18 40 []  Yes [x]  No    Telehealth   []  Yes []  No    Total          Therapy Diagnosis/Practice Pattern:I       Number of Comorbidities:  [x]0     []1-2    []3+    Latex Allergy:  [x]NO      []YES  Preferred Language for Healthcare:   [x]English       []other:    SUBJECTIVE:  Patient reports he has been trying to stretch more to help relieve some stiffness in the shoulder. Was able to change multiple light bulbs which required him to have his arms over his head for longer periods of time. No complaints of pain during that.      OBJECTIVE:    Observation:   ROM Initial (R) Current (R)   PROM flexion 60 155 PROM/ 148 AROM   ER 10 82 PROM (hightest 90 deg) / T-1 AROM   IR   68 deg PROM / T-12 AROM   Abd   165 AROM            Strength       Flex   4+   ABD   4   ER   4   IR    4+                       Horizontal Abduction with Resistance - 10 reps - 3 sets - 1x daily - 7x weekly   Supine PNF D2 Flexion with Resistance - 10 reps - 3 sets - 1x daily - 7x weekly   Sidelying Shoulder ER with Towel and Dumbbell - 10 reps - 3 sets - 1x daily - 7x weekly   Prone Shoulder Extension - Single Arm - 10 reps - 3 sets - 1x daily - 7x weekly   Shoulder Flexion Wall Walk - 10 reps - 1 sets - 5 hold - 1x daily - 7x weekly   Shoulder Internal Rotation with Resistance - 10 reps - 3 sets - 1x daily - 7x weekly   Shoulder External Rotation with Anchored Resistance - 10 reps - 3 sets - 1x daily - 7x weekly   Standing Shoulder Internal Rotation Stretch with Towel - 5 reps - 1 sets - 10 hold - 1x daily - 7x weekly     Therapeutic Exercise and NMR EXR  [x] (23201) Provided verbal/tactile cueing for activities related to strengthening, flexibility, endurance, ROM  for improvements in scapular, scapulothoracic and UE control with self care, reaching, carrying, lifting, house/yardwork, driving/computer work.    [] (17035) Provided verbal/tactile cueing for activities related to improving balance, coordination, kinesthetic sense, posture, motor skill, proprioception  to assist with  scapular, scapulothoracic and UE control with self care, reaching, carrying, lifting, house/yardwork, driving/computer work. Therapeutic Activities:    [] (82883 or 55006) Provided verbal/tactile cueing for activities related to improving balance, coordination, kinesthetic sense, posture, motor skill, proprioception and motor activation to allow for proper function of scapular, scapulothoracic and UE control with self care, carrying, lifting, driving/computer work.      Home Exercise Program:    [x] (37380) Reviewed/Progressed HEP activities related to strengthening, flexibility, endurance, ROM of scapular, scapulothoracic and UE control with self care, reaching, carrying, lifting, house/yardwork, driving/computer work  [] (07783) Reviewed/Progressed HEP proper joint functioning as indicated by patients Functional Deficits. [x] Progressing: [] Met: [] Not Met: [] Adjusted  3. Patient will demonstrate an increase in Strength to 4+/ 5 to allow for proper functional mobility as indicated by patients Functional Deficits. [x] Progressing: [] Met: [] Not Met: [] Adjusted  4. Patient will return to sleeping for 7-8 hours without increased symptoms or restriction. [] Progressing: [x] Met: [] Not Met: [] Adjusted  5. Able dress, drive, and complete light household work without increased symptoms or restrictions. [] Progressing: [x] Met: [] Not Met: [] Adjusted    Progression Towards Functional goals:  [x] Patient is progressing as expected towards functional goals listed. [] Progression is slowed due to complexities listed. [] Progression has been slowed due to co-morbidities. [] Plan just implemented, too soon to assess goals progression  [] Other:     ASSESSMENT: Patient tolerated treatment well. Still a lot of stiffness in the right bicep/pec area. Continue to address OH strength as well as ROM. Overall Progression Towards Functional goals/ Treatment Progress Update:  [x] Patient is progressing as expected towards functional goals listed. [] Progression is slowed due to complexities/Impairments listed. [] Progression has been slowed due to co-morbidities.   [] Plan just implemented, too soon to assess goals progression <30days   [] Goals require adjustment due to lack of progress  [] Patient is not progressing as expected and requires additional follow up with physician  [] Other    Prognosis for POC: [x] Good [] Fair  [] Poor      Patient requires continued skilled intervention: [x] Yes  [] No    Treatment/Activity Tolerance:  [x] Patient able to complete treatment  [] Patient limited by fatigue  [] Patient limited by pain    [] Patient limited by other medical complications  [] Other:          PLAN:   [x] Continue per plan of care [] Alter current plan (see comments above)  [] Plan of care initiated [] Hold pending MD visit [] Discharge      Electronically signed by:  TREVON Holbrook SPTA. Therapist was present, directed the patient's care, made skilled judgement, and was responsible for assessment and treatment of the patient. Note: If patient does not return for scheduled/ recommended follow up visits, this note will serve as a discharge from care along with most recent update on progress.

## 2021-04-20 ENCOUNTER — HOSPITAL ENCOUNTER (OUTPATIENT)
Dept: PHYSICAL THERAPY | Age: 56
Setting detail: THERAPIES SERIES
Discharge: HOME | End: 2021-04-20
Payer: COMMERCIAL

## 2021-04-20 PROCEDURE — 97140 MANUAL THERAPY 1/> REGIONS: CPT | Performed by: PHYSICAL THERAPIST

## 2021-04-20 PROCEDURE — 97110 THERAPEUTIC EXERCISES: CPT | Performed by: PHYSICAL THERAPIST

## 2021-04-20 NOTE — FLOWSHEET NOTE
[facility],  [address] [place]  Phone: [phone]  Fax [phone]        Date:  2021    Patient Name:  Chadwick Daniel    :  1965  MRN: [id number]  Restrictions/Precautions:    Medical/Treatment Diagnosis Information:  · Diagnosis: right shoulder pain  M25.511    s/p SAD with open biceps tenodesis   DOS:  12/15/20  · Treatment Diagnosis: right shoulder pain  P42.965  Insurance/Certification information:  PT Insurance Information: [facility] -$1500 deductible. 40 PT allowed, no auth. Physician Information:  Referring Practitioner: Dr. Sugar Wilkinson  Has the plan of care been signed (Y/N):        []  Yes  [x]  No     Date of Patient follow up with Physician: 20      Is this a Progress Report:     []  Yes  [x]  No        If Yes:  Date Range for reporting period:  Beginnin20  Ending:  3/8/21    Progress report will be due (10 Rx or 30 days whichever is less):        Recertification will be due (POC Duration  / 90 days whichever is less):       Visit # Insurance Allowable Auth Required   In-person 19 40 []  Yes [x]  No    Telehealth   []  Yes []  No    Total          Therapy Diagnosis/Practice Pattern:I       Number of Comorbidities:  [x]0     []1-2    []3+    Latex Allergy:  [x]NO      []YES  Preferred Language for Healthcare:   [x]English       []other:    SUBJECTIVE:  Pt is returning to work on Monday. Pt states that his shoulder is sore from sleeping on it. Pt is nervous about RTW. He hopes he notices improvement in strength while on the job.      OBJECTIVE:    Observation:   ROM Initial (R) Current (R)   PROM flexion 60 160 PROM/ 148 AROM   ER 10 90 PROM  / T-1 AROM   IR   65 deg PROM / T-12 AROM   Abd   165 AROM            Strength       Flex   4+   ABD   4   ER   4   IR    4+                          Pain scale 2-7 / 10 2   Quick Dash 45 = 77% 19  =  18%       RESTRICTIONS/PRECAUTIONS: biceps tenodesis. Pt said Dr is weaning him from sling. Pt understood this still means NO lifting at all with right UE. Exercises/Interventions:     Therapeutic Ex (19026) Sets/sec Reps Notes/CUES      hep      Serratus punch 15# X 30  hep   Supine punch 15# 3 x 10     Sleeper s : 20  5x  hep   Supine hor abd/    Supine PNF BL x 30 BL 2 x 15 hep   Unable    [employer] X 20        Functional lift to middle shelf 47# X 20    Ply[employer] Back- Chest pass / OH pass    Push Up on table   3 x 10     Chopping/Lifting against wall in mini squat position     Modified plank shoulder taps  X 30     Plate walks- Bell up 90/90 10# 2 trips     Cane stretch     Bent over row/  Bent over kickback 15# x 20 8# x 20     Ball on wall  X 20 e Weighted ball    True sTretch  X 10  3 ways        Functional lift    IR s with towel hep      Wall walks     Corner s :20  4 x     TB IR/ ER  BL  3 x 10     TB IR/ER at 90/90 BL  2 x 10     1/2 kneeling  horiz abd    Biceps s doorway :20 3x     Manual Intervention (96414)      PROM  /  Mobs/   STM  10 min                            NMR re-education (02121)   CUES NEEDED                                       Therapeutic Activity (79503)                        Access Code: [facility]   URL: [email or web address]   Date: 03/08/2021   Prepared by: Himanshu Roasles     Exercises   Supine Shoulder Flexion Extension AAROM with Dowel - 10 reps - 1 sets - 5 hold - 1x daily - 7x weekly   Supine Scapular Protraction in Flexion with Dumbbells - 10 reps - 3 sets - 1x daily - 7x weekly   Supine Cross Body Shoulder Stretch - 3 reps - 1 sets - 20 hold - 1x daily - 7x weekly   Sleeper Stretch - 5 reps - 1 sets - 10 hold - 1x daily - 7x weekly   Supine Shoulder Horizontal Abduction with Resistance - 10 reps - 3 sets - 1x daily - 7x weekly   Supine PNF D2 Flexion with Resistance - 10 reps - 3 sets - 1x daily - 7x weekly   Sidelying Shoulder ER with Towel and Dumbbell - 10 reps - 3 sets - 1x daily - 7x weekly   Prone Shoulder Extension - Single Arm - 10 reps - 3 sets - 1x daily - 7x weekly   Shoulder Flexion Wall Walk - 10 reps - 1 sets - 5 hold - 1x daily - 7x weekly   Shoulder Internal Rotation with Resistance - 10 reps - 3 sets - 1x daily - 7x weekly   Shoulder External Rotation with Anchored Resistance - 10 reps - 3 sets - 1x daily - 7x weekly   Standing Shoulder Internal Rotation Stretch with Towel - 5 reps - 1 sets - 10 hold - 1x daily - 7x weekly     Therapeutic Exercise and NMR EXR  [x] (33650) Provided verbal/tactile cueing for activities related to strengthening, flexibility, endurance, ROM  for improvements in scapular, scapulothoracic and UE control with self care, reaching, carrying, lifting, house/yardwork, driving/computer work.    [] (82405) Provided verbal/tactile cueing for activities related to improving balance, coordination, kinesthetic sense, posture, motor skill, proprioception  to assist with  scapular, scapulothoracic and UE control with self care, reaching, carrying, lifting, house/yardwork, driving/computer work. Therapeutic Activities:    [] (65298 or 25404) Provided verbal/tactile cueing for activities related to improving balance, coordination, kinesthetic sense, posture, motor skill, proprioception and motor activation to allow for proper function of scapular, scapulothoracic and UE control with self care, carrying, lifting, driving/computer work.      Home Exercise Program:    [x] (40349) Reviewed/Progressed HEP activities related to strengthening, flexibility, endurance, ROM of scapular, scapulothoracic and UE control with self care, reaching, carrying, lifting, house/yardwork, driving/computer work  [] (11571) Reviewed/Progressed HEP activities related to improving balance, coordination, kinesthetic sense, posture, motor skill, proprioception of scapular, scapulothoracic and UE control with self care, reaching, carrying, lifting, house/yardwork, driving/computer work      Manual Treatments:  PROM / STM / Oscillations-Mobs:  G-I, II, III, IV (PA's, Inf., Post.)  [x] (35216) Provided manual therapy to mobilize soft tissue/joints of cervical/CT, scapular GHJ and UE for the purpose of modulating pain, promoting relaxation,  increasing ROM, reducing/eliminating soft tissue swelling/inflammation/restriction, improving soft tissue extensibility and allowing for proper ROM for normal function with self care, reaching, carrying, lifting, house/yardwork, driving/computer work    Modalities:      Charges:  Timed Code Treatment Minutes: 45   Total Treatment Minutes: 45       [] EVAL (LOW) 58189   [] EVAL (MOD) 71723   [] EVAL (HIGH) 75985   [] RE-EVAL     [x] HK(32037) x   2  [] IONTO  [] NMR (04057) x     [] VASO  [x] Manual (03792) x 1     [] Other:ice  [] TA x      [] Mech Traction (14903)  [] ES(attended) (16928)      [] ES (un) (45779):     GOALS:  Patient stated goal: abolish shoulder pain. Return to work. [] Progressing: [] Met: [] Not Met: [] Adjusted    Therapist goals for Patient:   Short Term Goals: To be achieved in: 2 weeks  1. Independent in HEP and progression per patient tolerance, in order to prevent re-injury. [] Progressing: [] Met: [] Not Met: [] Adjusted  2. Patient will have a decrease in pain to facilitate improvement in movement, function, and ADLs as indicated by Functional Deficits. [] Progressing: [] Met: [] Not Met: [] Adjusted    Long Term Goals: To be achieved in: 12 weeks  1. Disability index score of 25% or less for the [facility] to assist with reaching prior level of function. [x] Progressing: [] Met: [] Not Met: [] Adjusted  2. Patient will demonstrate increased AROM to 150 flex, 90 ER and 70 IR to allow for proper joint functioning as indicated by patients Functional Deficits. [x] Progressing: [] Met: [] Not Met: [] Adjusted  3.  Patient will demonstrate an increase in Strength to 4+/ 5 to allow for proper functional mobility as indicated by patients Functional Deficits. [x] Progressing: [] Met: [] Not Met: [] Adjusted  4. Patient will return to sleeping for 7-8 hours without increased symptoms or restriction. [] Progressing: [x] Met: [] Not Met: [] Adjusted  5. Able dress, drive, and complete light household work without increased symptoms or restrictions. [] Progressing: [x] Met: [] Not Met: [] Adjusted    Progression Towards Functional goals:  [x] Patient is progressing as expected towards functional goals listed. [] Progression is slowed due to complexities listed. [] Progression has been slowed due to co-morbidities. [] Plan just implemented, too soon to assess goals progression  [] Other:     ASSESSMENT: Pt will be reassessed next visit for possible DC. Overall Progression Towards Functional goals/ Treatment Progress Update:  [x] Patient is progressing as expected towards functional goals listed. [] Progression is slowed due to complexities/Impairments listed. [] Progression has been slowed due to co-morbidities. [] Plan just implemented, too soon to assess goals progression <30days   [] Goals require adjustment due to lack of progress  [] Patient is not progressing as expected and requires additional follow up with physician  [] Other    Prognosis for POC: [x] Good [] Fair  [] Poor      Patient requires continued skilled intervention: [x] Yes  [] No    Treatment/Activity Tolerance:  [x] Patient able to complete treatment  [] Patient limited by fatigue  [] Patient limited by pain    [] Patient limited by other medical complications  [] Other:          PLAN:   [x] Continue per plan of care [] Alter current plan (see comments above)  [] Plan of care initiated [] Hold pending MD visit [] Discharge      Electronically signed by:  Lindsay Elise PT     Note: If patient does not return for scheduled/ recommended follow up visits, this note will serve as a discharge from care along with most recent update on progress.

## 2021-04-23 ENCOUNTER — HOSPITAL ENCOUNTER (OUTPATIENT)
Dept: PHYSICAL THERAPY | Age: 56
Setting detail: THERAPIES SERIES
Discharge: HOME | End: 2021-04-23
Payer: COMMERCIAL

## 2021-04-23 PROCEDURE — 97140 MANUAL THERAPY 1/> REGIONS: CPT | Performed by: PHYSICAL THERAPIST

## 2021-04-23 PROCEDURE — 97110 THERAPEUTIC EXERCISES: CPT | Performed by: PHYSICAL THERAPIST

## 2021-04-23 NOTE — FLOWSHEET NOTE
[facility],  [address]  Phone: [phone]  Fax [phone]        Date:  2021    Patient Name:  Maia Moe    :  1965  MRN: [id number]  Restrictions/Precautions:    Medical/Treatment Diagnosis Information:  · Diagnosis: right shoulder pain  M25.511    s/p SAD with open biceps tenodesis   DOS:  12/15/20  · Treatment Diagnosis: right shoulder pain  I63.296  Insurance/Certification information:  PT Insurance Information: [facility] -$1500 deductible. 40 PT allowed, no auth. Physician Information:  Referring Practitioner: Dr. Brianna Hebert  Has the plan of care been signed (Y/N):        []  Yes  [x]  No     Date of Patient follow up with Physician: 20      Is this a Progress Report:     [x]  Yes  []  No        If Yes:  Date Range for reporting period:  Beginnin20  Endin/21       Visit # Insurance Allowable Auth Required   In-person 20 40 []  Yes [x]  No    Telehealth   []  Yes []  No    Total          Therapy Diagnosis/Practice Pattern:I       Number of Comorbidities:  [x]0     []1-2    []3+    Latex Allergy:  [x]NO      []YES  Preferred Language for Healthcare:   [x]English       []other:    SUBJECTIVE:  Pt is nervous about returning to work on Monday. This is the longest he has been off. Pt is set to retire by . OBJECTIVE:    Observation:   Test measurements:  ROM Initial (R) Current (R)   PROM flexion 60 160 PROM/ 148 AROM   ER 10 90 PROM  / T-1 AROM   IR   65 deg PROM / T-12 AROM   Abd   165 AROM            Strength       Flex   5   ABD   5   ER   5   IR    5                          Pain scale 2-7 / 10 2   Quick Dash 45 = 77% 17   = 14%       RESTRICTIONS/PRECAUTIONS: biceps tenodesis. Pt said Dr is weaning him from sling. Pt understood this still means NO lifting at all with right UE.      Exercises/Interventions:     Therapeutic Ex (39166) Sets/sec Reps Notes/CUES      hep      Serratus punch 15# X 30  hep   Supine punch 15# 3 x 10     Sleeper s : 20  5x  hep   Supine hor abd/    Supine PNF BL x 30 BL 2 x 15 hep   Unable    [employer] X 20        Functional lift to middle shelf 47# X 20    [employer] Back- Chest pass / OH pass    Push Up on table   3 x 10     Chopping/Lifting against wall in mini squat position     Modified plank shoulder taps  X 30     Plate walks- Bell up 90/90 10# 2 trips     Cane stretch     Bent over row/  Bent over kickback 20# x 20 8# x 20     Ball on wall  X 20 e Weighted ball    True sTretch  X 10  3 ways        Functional lift    IR s with towel hep      Wall walks     Corner s :20  4 x     TB IR/ ER  BL  3 x 10     TB IR/ER at 90/90 BL  2 x 10     1/2 kneeling  horiz abd    Biceps s doorway :20 3x     Manual Intervention (82178)      PROM  /  Mobs/   STM  10 min                            NMR re-education (23732)   CUES NEEDED                                       Therapeutic Activity (31309)                        Access Code: [facility]   URL: [email or web address]   Date: 03/08/2021   Prepared by: Glenora Maw     Exercises   Supine Shoulder Flexion Extension AAROM with Dowel - 10 reps - 1 sets - 5 hold - 1x daily - 7x weekly   Supine Scapular Protraction in Flexion with Dumbbells - 10 reps - 3 sets - 1x daily - 7x weekly   Supine Cross Body Shoulder Stretch - 3 reps - 1 sets - 20 hold - 1x daily - 7x weekly   Sleeper Stretch - 5 reps - 1 sets - 10 hold - 1x daily - 7x weekly   Supine Shoulder Horizontal Abduction with Resistance - 10 reps - 3 sets - 1x daily - 7x weekly   Supine PNF D2 Flexion with Resistance - 10 reps - 3 sets - 1x daily - 7x weekly   Sidelying Shoulder ER with Towel and Dumbbell - 10 reps - 3 sets - 1x daily - 7x weekly   Prone Shoulder Extension - Single Arm - 10 reps - 3 sets - 1x daily - 7x weekly   Shoulder Flexion Wall Walk - 10 reps - 1 sets - 5 hold - 1x daily - 7x weekly   Shoulder Internal Rotation with Resistance - 10 reps - 3 sets - 1x daily - 7x weekly   Shoulder External Rotation with Anchored Resistance - 10 reps - 3 sets - 1x daily - 7x weekly   Standing Shoulder Internal Rotation Stretch with Towel - 5 reps - 1 sets - 10 hold - 1x daily - 7x weekly     Therapeutic Exercise and NMR EXR  [x] (62420) Provided verbal/tactile cueing for activities related to strengthening, flexibility, endurance, ROM  for improvements in scapular, scapulothoracic and UE control with self care, reaching, carrying, lifting, house/yardwork, driving/computer work.    [] (57304) Provided verbal/tactile cueing for activities related to improving balance, coordination, kinesthetic sense, posture, motor skill, proprioception  to assist with  scapular, scapulothoracic and UE control with self care, reaching, carrying, lifting, house/yardwork, driving/computer work. Therapeutic Activities:    [] (62291 or 57925) Provided verbal/tactile cueing for activities related to improving balance, coordination, kinesthetic sense, posture, motor skill, proprioception and motor activation to allow for proper function of scapular, scapulothoracic and UE control with self care, carrying, lifting, driving/computer work.      Home Exercise Program:    [x] (92822) Reviewed/Progressed HEP activities related to strengthening, flexibility, endurance, ROM of scapular, scapulothoracic and UE control with self care, reaching, carrying, lifting, house/yardwork, driving/computer work  [] (56430) Reviewed/Progressed HEP activities related to improving balance, coordination, kinesthetic sense, posture, motor skill, proprioception of scapular, scapulothoracic and UE control with self care, reaching, carrying, lifting, house/yardwork, driving/computer work      Manual Treatments:  PROM / STM / Oscillations-Mobs:  G-I, II, III, IV (PA's, Inf., Post.)  [x] (56356) Provided manual therapy to mobilize soft tissue/joints of cervical/CT, scapular GHJ and UE for the purpose of modulating pain, promoting relaxation,  increasing ROM, reducing/eliminating soft tissue swelling/inflammation/restriction, improving soft tissue extensibility and allowing for proper ROM for normal function with self care, reaching, carrying, lifting, house/yardwork, driving/computer work    Modalities:      Charges:  Timed Code Treatment Minutes: 45   Total Treatment Minutes: 45       [] EVAL (LOW) 48347   [] EVAL (MOD) 06213   [] EVAL (HIGH) 75954   [] RE-EVAL     [x] SN(38561) x   2  [] IONTO  [] NMR (17158) x     [] VASO  [x] Manual (40319) x 1     [] Other:ice  [] TA x      [] Mech Traction (42567)  [] ES(attended) (14175)      [] ES (un) (62952):     GOALS:  Patient stated goal: abolish shoulder pain. Return to work. [] Progressing: [] Met: [] Not Met: [] Adjusted    Therapist goals for Patient:   Short Term Goals: To be achieved in: 2 weeks  1. Independent in HEP and progression per patient tolerance, in order to prevent re-injury. [] Progressing: [x] Met: [] Not Met: [] Adjusted  2. Patient will have a decrease in pain to facilitate improvement in movement, function, and ADLs as indicated by Functional Deficits. [] Progressing: [x] Met: [] Not Met: [] Adjusted    Long Term Goals: To be achieved in: 12 weeks  1. Disability index score of 25% or less for the [facility] to assist with reaching prior level of function. [] Progressing: [x] Met: [] Not Met: [] Adjusted  2. Patient will demonstrate increased AROM to 150 flex, 90 ER and 70 IR to allow for proper joint functioning as indicated by patients Functional Deficits. [] Progressing: [x] Met: [] Not Met: [] Adjusted  3. Patient will demonstrate an increase in Strength to 4+/ 5 to allow for proper functional mobility as indicated by patients Functional Deficits. [] Progressing: [x] Met: [] Not Met: [] Adjusted  4. Patient will return to sleeping for 7-8 hours without increased symptoms or restriction.    [] Progressing: [x] Met: [] Not Met: [] Adjusted  5. Able dress, drive, and complete light household work without increased symptoms or restrictions. [] Progressing: [x] Met: [] Not Met: [] Adjusted    Progression Towards Functional goals:  [x] Patient is progressing as expected towards functional goals listed. [] Progression is slowed due to complexities listed. [] Progression has been slowed due to co-morbidities. [] Plan just implemented, too soon to assess goals progression  [] Other:     ASSESSMENT: DC to HEP    Overall Progression Towards Functional goals/ Treatment Progress Update:  [x] Patient is progressing as expected towards functional goals listed. [] Progression is slowed due to complexities/Impairments listed. [] Progression has been slowed due to co-morbidities. [] Plan just implemented, too soon to assess goals progression <30days   [] Goals require adjustment due to lack of progress  [] Patient is not progressing as expected and requires additional follow up with physician  [] Other    Prognosis for POC: [x] Good [] Fair  [] Poor      Patient requires continued skilled intervention: [x] Yes  [] No    Treatment/Activity Tolerance:  [x] Patient able to complete treatment  [] Patient limited by fatigue  [] Patient limited by pain    [] Patient limited by other medical complications  [] Other:          PLAN:   [] Continue per plan of care [] Alter current plan (see comments above)  [] Plan of care initiated [] Hold pending MD visit [x] Discharge      Electronically signed by:  Cornel Orozco PT     Note: If patient does not return for scheduled/ recommended follow up visits, this note will serve as a discharge from care along with most recent update on progress.

## 2021-12-28 NOTE — PROGRESS NOTES
Dr Valene Cockayne called, made aware of repeat ABG's, current settings, current vitals, and new order for vecuronium. No new orders received at this time. RN to continue to monitor closely. Distal circulatory status and neurologic status is intact. Sensation is intact to light touch in the axillary, median, radial, and ulnar nerve distribution. The EPL, FPL, and interossei are grossly intact. The right upper extremity is warm and well-perfused with brisk capillary refill. Patient tolerated gentle passive range of motion. Sutures out today and begin to progress his range of motion. ASSESSMENT (Medical Decision Making)    Jose Elias Peraza is a 48 y.o. male progressing well from right shoulder arthroscopy with open biceps tenodesis, subacromial decompression, labral debridement, and a distal clavicle resection. .    No diagnosis found. PLAN (Medical Decision Making)  Office Procedures:  No orders of the defined types were placed in this encounter. Treatment Plan:  Sutures removed today. Sling is no longer necessary. Specific precautions were reviewed and emphasized. Patient will continue outpatient physical therapy. Address his range of motion. Follow up appointment was arranged at patient's convenience in 4 weeks. Non-steroidal anti-inflammatories medications (NSAIDs) can be used to assist with pain control and to reduce inflammatory changes. These medications may be over-the-counter or prescribed. We discussed taking the NSAID properly and the precautions. The patient understands that this medication may potentially interfere with other medications. Patient was also instructed to immediately discontinue the medication is there is any possible complication. Christi Lopez was instructed to call the office if his symptoms worsen or if new symptoms appear prior to the next scheduled visit. He is specifically instructed to contact the office between now and schedule appointment if he has concerns related to his condition or if he needs assistance in scheduling any above tests. He is welcome to call for an appointment sooner if he has any additional concerns or questions. This dictation was performed with a verbal recognition program (DRAGON) and it was checked for errors. It is possible that there are still dictated errors within this office note. If so, please bring any errors to my attention for an addendum. All efforts were made to ensure that this office note is accurate.

## (undated) DEVICE — CATHETER IV 20GA L1.25IN PNK FEP SFTY STR HUB RADPQ DISP

## (undated) DEVICE — MEDI-VAC NON-CONDUCTIVE SUCTION TUBING: Brand: CARDINAL HEALTH

## (undated) DEVICE — SOLUTION IV IRRIG 500ML 0.9% SODIUM CHL 2F7123

## (undated) DEVICE — 3M™ STERI-DRAPE™ U-DRAPE, LONG 1019: Brand: STERI-DRAPE™

## (undated) DEVICE — GOWN,REINFORCED,POLY,AURORA,XXLARGE,STR: Brand: MEDLINE

## (undated) DEVICE — LIGHT HANDLE: Brand: DEVON

## (undated) DEVICE — SUTURE VCRL SZ 2-0 L18IN ABSRB UD CT-1 L36MM 1/2 CIR J839D

## (undated) DEVICE — SHEET,DRAPE,53X77,STERILE: Brand: MEDLINE

## (undated) DEVICE — PACK PROCEDURE SURG ARTHSCP CUST

## (undated) DEVICE — SOLUTION IV 1000ML LAC RINGERS PH 6.5 INJ USP VIAFLX PLAS

## (undated) DEVICE — 1810 FOAM BLOCK NEEDLE COUNTER: Brand: DEVON

## (undated) DEVICE — PENCIL ES L3M BTTN SWCH S STL HEX LOK BLDE ELECTRD HOLSTER

## (undated) DEVICE — INTENDED FOR TISSUE SEPARATION, AND OTHER PROCEDURES THAT REQUIRE A SHARP SURGICAL BLADE TO PUNCTURE OR CUT.: Brand: BARD-PARKER ® STAINLESS STEEL BLADES

## (undated) DEVICE — 3M™ TEGADERM™ TRANSPARENT FILM DRESSING FRAME STYLE, 1624W, 2-3/8 IN X 2-3/4 IN (6 CM X 7 CM), 100/CT 4CT/CASE: Brand: 3M™ TEGADERM™

## (undated) DEVICE — DYONICS 4.0 MM ELITE                                    ACROMIOBLASTER STRAIGHT DISPOSABLE                                    BURRS, SAGE GREEN, 10000 MAXIMUM                                    RPM, PACKAGED 6 PER BOX, STERILE

## (undated) DEVICE — 4-PORT MANIFOLD: Brand: NEPTUNE 2

## (undated) DEVICE — 3M™ IOBAN™ 2 ANTIMICROBIAL INCISE DRAPE 6650EZ: Brand: IOBAN™ 2

## (undated) DEVICE — AMBIENT SUPER TURBOVAC 90: Brand: COBLATION

## (undated) DEVICE — GLOVE SURG SZ 75 L12IN FNGR THK94MIL STD WHT LTX FREE

## (undated) DEVICE — ENDOSCOPIC CANNULATED DRILL BIT,                                    4.5 MM, STERILE

## (undated) DEVICE — SOLUTION IRRIG 5L LAC R BG

## (undated) DEVICE — ELECTRODE PT RET AD L9FT HI MOIST COND ADH HYDRGEL CORDED

## (undated) DEVICE — SET ADMIN PRIMING 7ML L30IN 7.35LB 20 GTT 2ND RLER CLMP

## (undated) DEVICE — SUTURE ETHLN SZ 4-0 L18IN NONABSORBABLE BLK L19MM PS-2 3/8 1667H

## (undated) DEVICE — BLADE CLIPPER GEN PURP NS

## (undated) DEVICE — Z INACTIVE USE 2423038 APPLICATOR MEDICATED 10.5 CC CHLORHEXIDINE GLUC 2%

## (undated) DEVICE — DRESSING,GAUZE,XEROFORM,CURAD,1"X8",ST: Brand: CURAD

## (undated) DEVICE — SHOULDER STABILIZATION KIT,                                    DISPOSABLE 12 PER BOX

## (undated) DEVICE — KENDALL SCD EXPRESS SLEEVES, KNEE LENGTH, LARGE: Brand: KENDALL SCD

## (undated) DEVICE — CANNULA THREADED FLEX 8.0 X 72MM: Brand: CLEAR-TRAC

## (undated) DEVICE — GLOVE,SURG,SENSICARE,ALOE,LF,PF,7: Brand: MEDLINE

## (undated) DEVICE — TUBE IRRIG L8IN LNG PT W/ CONN FOR PMP SYS REDEUCE

## (undated) DEVICE — SET GRAV VENT NVENT CK VLV 3 NDL FREE PRT 10 GTT

## (undated) DEVICE — SUTURE NONABSORBABLE MONOFILAMENT 3-0 PS-1 18 IN BLK ETHILON 1663H

## (undated) DEVICE — SUTURE FIBERLOOP SZ 2-0 L20IN NONABSORBABLE BLU STR NDL AR7234

## (undated) DEVICE — GLOVE SURG SZ 8 L12IN FNGR THK94MIL STD WHT LTX FREE

## (undated) DEVICE — PAD,ABDOMINAL,8"X10",ST,LF: Brand: MEDLINE

## (undated) DEVICE — BLADE ES ELASTOMERIC COAT INSUL DURABLE BEND UPTO 90DEG

## (undated) DEVICE — SWITCH DRAPE TENET 7633

## (undated) DEVICE — 2.4MM X 10 INCH DRILL-TIP GUIDE WIRE: Brand: ENDOBUTTON

## (undated) DEVICE — TUBING PMP L8FT LNG W/ CONN FOR AR-6400 REDEUCE

## (undated) DEVICE — GAUZE,SPONGE,4"X4",16PLY,STRL,LF,10/TRAY: Brand: MEDLINE

## (undated) DEVICE — GLOVE ORTHO 8   MSG9480

## (undated) DEVICE — TOWEL,OR,DSP,ST,BLUE,STD,8/PK,10PK/CS: Brand: MEDLINE

## (undated) DEVICE — 3.5 MM FULL RADIUS STRAIGHT                                    BLADES, POWER/EP-1, BEIGE, PACKAGED                                    6 PER BOX, STERILE

## (undated) DEVICE — FLUID CONTROL SHOULDER DRAPE PACK: Brand: CONVERTORS

## (undated) DEVICE — PACK COOL L W14XL6.5IN 3 LAYR CONSTR SFT CLP CLSR 4 TIE